# Patient Record
Sex: FEMALE | Race: WHITE | URBAN - METROPOLITAN AREA
[De-identification: names, ages, dates, MRNs, and addresses within clinical notes are randomized per-mention and may not be internally consistent; named-entity substitution may affect disease eponyms.]

---

## 2017-01-11 ENCOUNTER — HISTORICAL (OUTPATIENT)
Dept: LAB | Facility: HOSPITAL | Age: 22
End: 2017-01-11

## 2017-01-17 ENCOUNTER — HISTORICAL (OUTPATIENT)
Dept: RADIOLOGY | Facility: HOSPITAL | Age: 22
End: 2017-01-17

## 2017-06-01 ENCOUNTER — HISTORICAL (OUTPATIENT)
Dept: LAB | Facility: HOSPITAL | Age: 22
End: 2017-06-01

## 2019-04-15 ENCOUNTER — HISTORICAL (OUTPATIENT)
Dept: ADMINISTRATIVE | Facility: HOSPITAL | Age: 24
End: 2019-04-15

## 2019-04-16 ENCOUNTER — HISTORICAL (OUTPATIENT)
Dept: ADMINISTRATIVE | Facility: HOSPITAL | Age: 24
End: 2019-04-16

## 2019-04-18 ENCOUNTER — HISTORICAL (OUTPATIENT)
Dept: SURGERY | Facility: HOSPITAL | Age: 24
End: 2019-04-18

## 2019-04-18 LAB — B-HCG SERPL QL: NEGATIVE

## 2019-05-29 ENCOUNTER — HISTORICAL (OUTPATIENT)
Dept: ADMINISTRATIVE | Facility: HOSPITAL | Age: 24
End: 2019-05-29

## 2022-04-30 NOTE — OP NOTE
Patient:   Jaylin Olivia            MRN: [id number]            FIN: [id number]               Age:   23 years     Sex:  Female     :  1995   Associated Diagnoses:   None   Author:   Ritchie Elias MD      Date of surgery: 2019    Surgeon: Ritchie Elias MD    Attending: Dr. Cuello    Preoperative diagnosis: Right ankle lateral malleolus fracture    Postoperative diagnosis: Same    Procedure performed: Open reduction internal fixation of right lateral malleolus fracture    Implants: Arthrex distal fibular locking plate, 2.7 mm cortical screw x1, shaft cortical screws x3, 2.7 mm locking screws x4    Estimated blood loss: Minimal    Complications: None    Operative technique    The patient is a 23-year-old female who sustained a right lateral malleolus fracture of the ankle after twisting injury.  There is significant fracture displacement she has significant pain and swelling as well.  We offered her open reduction internal fixation as is appropriate for her injury.  We discussed risks and benefits of surgery as well as nonoperative treatment.  She elected to proceed with surgery.  The most pertinent risks we discussed were infection, bleeding, nerve damage, malunion need for hardware removal, hardware failure, arthritis.  She signed informed consent.  She was seen in the preoperative area where she was verified to be n.p.o., the operative site was identified, and she was taken to the operating room.  She received perioperative antibiotics as appropriate.  She was administered general endotracheal anesthesia and placed in the supine position with all bony prominences well-padded.  An x-ray of the contralateral ankle mortise was taken to assist with reduction of the operative limb.  A tourniquet was placed to the right lower extremity.  Right lower extremity was prepped and draped in the usual sterile fashion and a timeout was performed.    Limb was exsanguinated and the tourniquet was  inflated to 300 mmHg.  A 10 blade was used to perform a lateral incision over the fibula through skin and subcutaneous tissue.  Dissection was taken to bone and fracture site with care to not damage the superficial peroneal nerve which was visualized in the proximal external wound.  The nerve was protected.  Fracture was fully identified and all hematoma, soft callus, and developed periosteum was removed from the fracture.  A serrated reduction clamp was used to reduce the fracture.  Up with the clamp in place fluoroscopy shot was taken to confirm that appropriate length and reduction had been obtained.  Reduction was excellent.  A anterior to posterior 2.7 mm cortical screw was placed between the fracture fragments in the AO lag by technique fashion.  Excellent purchase and compression was obtained to the fracture.  Next an Arthrex distal fibular locking plate was positioned.  The plate was fixed to the shaft with a cortical screw.  The distal screw holes were drilled through the appropriate guides.  4 locking screws were placed distal to the fracture.  A fluoroscopy shot confirmed the appropriate length and that they were extra-articular.  The remaining 2 shaft cortical holes were filled with excellent bite with cortical screws.  A syndesmosis radiographic stress test was performed with a serrated reduction clamp and no syndesmosis instability was identified.  Final fluoroscopic images were obtained.  The wound was thoroughly irrigated with normal saline and closed with Vicryl and nylon.  A soft dressing with a well-padded posterior slab splint with stirrups was placed.  Tourniquet was let down.  The patient was awakened from general endotracheal anesthesia having suffered no untoward events from the procedure.    Postoperative plan:  Patient will be nonweightbearing for approximately 6 weeks  Sutures out of 2 weeks  X-rays in 6 weeks.

## 2022-05-02 NOTE — HISTORICAL [facility] CERNER
This is a historical note converted from [employer]. Formatting and pictures may have been removed.  Please reference Roxane for original formatting and attached multimedia. Chief Complaint  Here with Rt. Distal Fib Fx. DOI ?04/11/19  History of Present Illness  23-year-old female who slipped?on Thursday.? She felt a crunch and had immediate pain in her right ankle. ?She has had significant pain and swelling?since this point. ?She has had difficulty walking on her crutches as well as she is a history of a multi-ligamentous knee reconstruction.? She denies any baseline issues with her foot or ankle. ?She denies any numbness tingling. ?She said there is never any bleeding or bone that popped out of the skin.? She has been in her moms old cam boot since.  Review of Systems  As per HPI  Physical Exam  Vitals & Measurements  HT:?167.74?cm? WT:?92.5?kg? BMI:?32.88?  General alert and oriented in no acute distress  Chest normal work of breathing  Abdomen soft, nondistended  Right lower extremity  Significant plantar ecchymosis and lateral ecchymosis.? Patient has significant swelling over the lateral ankle however she has?an intact wrinkle sign.? She is fully neurovascular intact. ?EHL, FHL, tibialis anterior, gastrocsoleus complex are intact. ?Dorsalis pedis pulses +2.? Patient has no wounds.  Assessment/Plan  Fractured lateral malleolus  ?I reviewed the patients films and she has a displaced lateral malleolus fracture with medial clear space widening on the gravity stress view. ?I discussed the natural history nature of this injury as well as the risks and benefits of various treatment options including no intervention, immobilization, and surgical solutions including open reduction internal fixation. ?Believe the open reduction internal fixation would be the best course of action for this patient given the displacement and medial clear space widening on the gravity stress film as well as her age.? I discussed the risks and  benefits of this treatment with the most pertinent risks being?a nonunion, malunion, hardware failure, need for future hardware removal, numbness,?pain, bleeding,?and infection.? She elected to proceed with surgery and signed informed consent. ?We will plan on her?receiving open reduction internal fixation of the right lateral malleolus?on Thursday the 18th?with possible syndesmosis fixation. ?Will use Arthrex for this.? Placed in?into a?posterior slab splint with stirrups in the meantime to help control any motion and further reduce swelling.  Ordered:  XR Ankle Right Minimum 3 Views, Routine, 04/15/19 10:53:00 CDT, Follow Up Trauma, None, Stretcher, Rad Type, Fractured lateral malleolus, Not Scheduled, 04/15/19 10:53:00 CDT  ?   Problem List/Past Medical History  Ongoing  Obesity  Tobacco user  Historical  None  Procedure/Surgical History  Arthroscopically aided anterior cruciate ligament repair/augmentation or reconstruction. (07/14/2014)  Arthroscopically aided posterior cruciate ligament repair/augmentation or reconstruction. (07/14/2014)  Arthroscopy ACL Reconstruction (Right) (07/14/2014)  Arthroscopy, knee, surgical; with meniscus repair (medial OR lateral). (07/14/2014)  HOSPITAL OBSERVATION SERVICE, PER HOUR (07/14/2014)  HOSPITAL OBSERVATION SERVICE, PER HOUR (07/14/2014)  HOSPITAL OBSERVATION SERVICE, PER HOUR (07/14/2014)  HOSPITAL OBSERVATION SERVICE, PER HOUR (07/14/2014)  HOSPITAL OBSERVATION SERVICE, PER HOUR (07/14/2014)  HOSPITAL OBSERVATION SERVICE, PER HOUR (07/14/2014)  HOSPITAL OBSERVATION SERVICE, PER HOUR (07/14/2014)  HOSPITAL OBSERVATION SERVICE, PER HOUR (07/14/2014)  HOSPITAL OBSERVATION SERVICE, PER HOUR (07/14/2014)  HOSPITAL OBSERVATION SERVICE, PER HOUR (07/14/2014)  HOSPITAL OBSERVATION SERVICE, PER HOUR (07/14/2014)  HOSPITAL OBSERVATION SERVICE, PER HOUR (07/14/2014)  HOSPITAL OBSERVATION SERVICE, PER HOUR (07/14/2014)  HOSPITAL OBSERVATION SERVICE, PER HOUR (07/14/2014)  HOSPITAL  OBSERVATION SERVICE, PER HOUR (07/14/2014)  HOSPITAL OBSERVATION SERVICE, PER HOUR (07/14/2014)  HOSPITAL OBSERVATION SERVICE, PER HOUR (07/14/2014)  HOSPITAL OBSERVATION SERVICE, PER HOUR (07/14/2014)  HOSPITAL OBSERVATION SERVICE, PER HOUR (07/14/2014)  HOSPITAL OBSERVATION SERVICE, PER HOUR (07/14/2014)  HOSPITAL OBSERVATION SERVICE, PER HOUR (07/14/2014)  HOSPITAL OBSERVATION SERVICE, PER HOUR (07/14/2014)  HOSPITAL OBSERVATION SERVICE, PER HOUR (07/14/2014)  HOSPITAL OBSERVATION SERVICE, PER HOUR (07/14/2014)  HOSPITAL OBSERVATION SERVICE, PER HOUR (07/14/2014)  HOSPITAL OBSERVATION SERVICE, PER HOUR (07/14/2014)  HOSPITAL OBSERVATION SERVICE, PER HOUR (07/14/2014)  HOSPITAL OBSERVATION SERVICE, PER HOUR (07/14/2014)  HOSPITAL OBSERVATION SERVICE, PER HOUR (07/14/2014)  HOSPITAL OBSERVATION SERVICE, PER HOUR (07/14/2014)  HOSPITAL OBSERVATION SERVICE, PER HOUR (07/14/2014)  HOSPITAL OBSERVATION SERVICE, PER HOUR (07/14/2014)  HOSPITAL OBSERVATION SERVICE, PER HOUR (07/14/2014)  HOSPITAL OBSERVATION SERVICE, PER HOUR (07/14/2014)  HOSPITAL OBSERVATION SERVICE, PER HOUR (07/14/2014)  HOSPITAL OBSERVATION SERVICE, PER HOUR (07/14/2014)  HOSPITAL OBSERVATION SERVICE, PER HOUR (07/14/2014)  HOSPITAL OBSERVATION SERVICE, PER HOUR (07/14/2014)  HOSPITAL OBSERVATION SERVICE, PER HOUR (07/14/2014)  HOSPITAL OBSERVATION SERVICE, PER HOUR (07/14/2014)  HOSPITAL OBSERVATION SERVICE, PER HOUR (07/14/2014)  HOSPITAL OBSERVATION SERVICE, PER HOUR (07/14/2014)  HOSPITAL OBSERVATION SERVICE, PER HOUR (07/14/2014)  HOSPITAL OBSERVATION SERVICE, PER HOUR (07/14/2014)  HOSPITAL OBSERVATION SERVICE, PER HOUR (07/14/2014)  Other repair of knee (07/14/2014)  Other repair of the collateral ligaments (07/14/2014)  Other repair of the cruciate ligaments (07/14/2014)  Posterior Cruciate Ligament Reconstruct (Right) (07/14/2014)  Unlisted procedure, arthroscopy. (07/14/2014)  Closed reduction of dislocation of knee  (07/02/2014)  Continuous invasive mechanical ventilation for less than 96 consecutive hours (07/02/2014)  Insertion of endotracheal tube (07/02/2014)  Internal fixation of femur without fracture reduction (07/02/2014)  Intramedullary Dusty Insert Retro Femur (Left) (07/02/2014)  Other incision of pleura (07/02/2014)  Transfusion of packed cells (07/02/2014)  Cholecystectomy;  pe tubes  tonselectomy   Medications  busPIRone 5 mg oral tablet, 5 mg= 1 tab(s), Oral, Daily,? ?Not Taking, Completed Rx  citalopram 20 mg oral tablet, 20 mg= 1 tab(s), Oral, qPM,? ?Not Taking, Completed Rx  Imitrex 25 mg oral tablet, 25 mg= 1 tab(s), Oral, Daily, PRN, 1 refills,? ?Not taking  Norco 10 mg-325 mg oral tablet, 1 tab(s), Oral, q6hr, PRN  Allergies  No Known Allergies  Social History  Alcohol  Tobacco  10 or more cigarettes (1/2 pack or more)/day in last 30 days, Cigarettes, No, Ready to change: No., 04/15/2019  Never (less than 100 in lifetime), No, 04/11/2019  10 or more cigarettes (1/2 pack or more)/day in last 30 days, Cigarettes, No, Ready to change: No., 04/05/2019  Current some day smoker, 09/23/2017  Health Maintenance  Health Maintenance  ???Pending?(in the next year)  ??? ??Due?  ??? ? ? ?ADL Screening due??04/15/19??and every 1??year(s)  ??? ? ? ?Alcohol Misuse Screening due??04/15/19??and every 1??year(s)  ??? ? ? ?Smoking Cessation due??04/15/19??Variable frequency  ??? ? ? ?Tetanus Vaccine due??04/15/19??and every 10??year(s)  ???Satisfied?(in the past 1 year)  ??? ??Satisfied?  ??? ? ? ?Blood Pressure Screening on??04/12/19.??Satisfied by Angela PHAM, Darlene WALTERS  ??? ? ? ?Body Mass Index Check on??04/15/19.??Satisfied by Cecily Dowell LPN  ??? ? ? ?Obesity Screening on??04/15/19.??Satisfied by Cecily Dowell LPN  ?  ?      I was present with the resident during the history and exam.  ???  [x ] I discussed the case with the resident and agree with the findings and plan as documented in the residents note.  [ ]  I discussed the case with the resident and agree with the findings and plan as documented in the residents note except:x

## 2022-05-02 NOTE — HISTORICAL [facility] CERNER
This is a historical note converted from [employer]. Formatting and pictures may have been removed.  Please reference [employer] for original formatting and attached multimedia. Patient was referred to Dr. Conway in [place] for evaluation for TKA.? I was informed by Dr. Hand office that patient has been scheduled for evaluation on 1/22/2020 at 10:30 AM. ?Patient is aware.

## 2022-05-02 NOTE — HISTORICAL [facility] CERNER
This is a historical note converted from [employer]ane. Formatting and pictures may have been removed.  Please reference Roxane for original formatting and attached multimedia. Chief Complaint  F/U Rt ankle Fx  History of Present Illness  23-year-old female?6 weeks status post open reduction internal fixation right lateral malleolus fracture.??She did say that she has been walking?on the right ankle the past couple weeks as it is felt better. ?No additional falls.? 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 ?She says when she gets back to work she is can be in a supervisory role initially not walking around too much she feels that she can meet these requirements. ?She will gradually increase her activity level as?her stiffness in her ankle resolves and she is walking around more normally.? No issues with the incision site or signs of infection.  Review of Systems  As per [facility]  Physical Exam  Vitals & Measurements  HT:?167?cm? WT:?89.1?kg? BMI:?31.95?  Gen:? NAD, A+Ox3  Cardio:? RRR  Pulm:? No increased WOB  ?   RLE:  Incision well-healed, no signs of infection  No swelling  No tenderness to palpation about fracture site  Motor: intact ehl, fhl, ta, gs  SILT: t/s/s/sp/dp  2+ DP, bcr, wwp  ?   X-ray right ankle healed lateral malleolus fracture no signs of hardware failure.  Assessment/Plan  Closed right ankle fracture?S82.891A  23-year-old female?6 weeks status post open reduction internal fixation right lateral malleolus fracture without syndesmotic fixation.  -Weight-bear as tolerated?right lower extremity,?discontinue cam boot.? Resume normal shoewear. ?Work note provided.? Frequent breaks may return to work next week if she is feeling she is walking around better which she assures me she?will be.? Return to clinic as needed.   Problem List/Past Medical History  Ongoing  Obesity  Tobacco user  Historical  None  Procedure/Surgical History  Open treatment of distal fibular fracture (lateral malleolus), includes  internal fixation, when performed (04/18/2019)  ORIF Ankle (Right) (04/18/2019)  Reposition Right Fibula with Internal Fixation Device, Open Approach (04/18/2019)  Application of short leg splint (calf to foot) (04/11/2019)  Immobilization of Right Lower Leg using Splint (04/11/2019)  Arthroscopically aided anterior cruciate ligament repair/augmentation or reconstruction. (07/14/2014)  Arthroscopically aided posterior cruciate ligament repair/augmentation or reconstruction. (07/14/2014)  Arthroscopy ACL Reconstruction (Right) (07/14/2014)  Arthroscopy, knee, surgical; with meniscus repair (medial OR lateral). (07/14/2014)  HOSPITAL OBSERVATION SERVICE, PER HOUR (07/14/2014)  HOSPITAL OBSERVATION SERVICE, PER HOUR (07/14/2014)  HOSPITAL OBSERVATION SERVICE, PER HOUR (07/14/2014)  HOSPITAL OBSERVATION SERVICE, PER HOUR (07/14/2014)  HOSPITAL OBSERVATION SERVICE, PER HOUR (07/14/2014)  HOSPITAL OBSERVATION SERVICE, PER HOUR (07/14/2014)  HOSPITAL OBSERVATION SERVICE, PER HOUR (07/14/2014)  HOSPITAL OBSERVATION SERVICE, PER HOUR (07/14/2014)  HOSPITAL OBSERVATION SERVICE, PER HOUR (07/14/2014)  HOSPITAL OBSERVATION SERVICE, PER HOUR (07/14/2014)  HOSPITAL OBSERVATION SERVICE, PER HOUR (07/14/2014)  HOSPITAL OBSERVATION SERVICE, PER HOUR (07/14/2014)  HOSPITAL OBSERVATION SERVICE, PER HOUR (07/14/2014)  HOSPITAL OBSERVATION SERVICE, PER HOUR (07/14/2014)  HOSPITAL OBSERVATION SERVICE, PER HOUR (07/14/2014)  HOSPITAL OBSERVATION SERVICE, PER HOUR (07/14/2014)  HOSPITAL OBSERVATION SERVICE, PER HOUR (07/14/2014)  HOSPITAL OBSERVATION SERVICE, PER HOUR (07/14/2014)  HOSPITAL OBSERVATION SERVICE, PER HOUR (07/14/2014)  HOSPITAL OBSERVATION SERVICE, PER HOUR (07/14/2014)  HOSPITAL OBSERVATION SERVICE, PER HOUR (07/14/2014)  HOSPITAL OBSERVATION SERVICE, PER HOUR (07/14/2014)  HOSPITAL OBSERVATION SERVICE, PER HOUR (07/14/2014)  HOSPITAL OBSERVATION SERVICE, PER HOUR (07/14/2014)  HOSPITAL OBSERVATION SERVICE, PER HOUR  (07/14/2014)  HOSPITAL OBSERVATION SERVICE, PER HOUR (07/14/2014)  HOSPITAL OBSERVATION SERVICE, PER HOUR (07/14/2014)  HOSPITAL OBSERVATION SERVICE, PER HOUR (07/14/2014)  HOSPITAL OBSERVATION SERVICE, PER HOUR (07/14/2014)  HOSPITAL OBSERVATION SERVICE, PER HOUR (07/14/2014)  HOSPITAL OBSERVATION SERVICE, PER HOUR (07/14/2014)  HOSPITAL OBSERVATION SERVICE, PER HOUR (07/14/2014)  HOSPITAL OBSERVATION SERVICE, PER HOUR (07/14/2014)  HOSPITAL OBSERVATION SERVICE, PER HOUR (07/14/2014)  HOSPITAL OBSERVATION SERVICE, PER HOUR (07/14/2014)  HOSPITAL OBSERVATION SERVICE, PER HOUR (07/14/2014)  HOSPITAL OBSERVATION SERVICE, PER HOUR (07/14/2014)  HOSPITAL OBSERVATION SERVICE, PER HOUR (07/14/2014)  HOSPITAL OBSERVATION SERVICE, PER HOUR (07/14/2014)  HOSPITAL OBSERVATION SERVICE, PER HOUR (07/14/2014)  HOSPITAL OBSERVATION SERVICE, PER HOUR (07/14/2014)  HOSPITAL OBSERVATION SERVICE, PER HOUR (07/14/2014)  HOSPITAL OBSERVATION SERVICE, PER HOUR (07/14/2014)  HOSPITAL OBSERVATION SERVICE, PER HOUR (07/14/2014)  HOSPITAL OBSERVATION SERVICE, PER HOUR (07/14/2014)  Other repair of knee (07/14/2014)  Other repair of the collateral ligaments (07/14/2014)  Other repair of the cruciate ligaments (07/14/2014)  Posterior Cruciate Ligament Reconstruct (Right) (07/14/2014)  Unlisted procedure, arthroscopy. (07/14/2014)  Closed reduction of dislocation of knee (07/02/2014)  Continuous invasive mechanical ventilation for less than 96 consecutive hours (07/02/2014)  Insertion of endotracheal tube (07/02/2014)  Internal fixation of femur without fracture reduction (07/02/2014)  Intramedullary Dusty Insert Retro Femur (Left) (07/02/2014)  Other incision of pleura (07/02/2014)  Transfusion of packed cells (07/02/2014)  Cholecystectomy;  pe tubes  tonselectomy   Medications  acetaminophen-hydrocodone 325 mg-10 mg oral tablet, 1 tab(s), Oral, QID,? ?Not Taking, Completed Rx  acetaminophen-oxycodone 325 mg-5 mg oral tablet, Oral,? ?Not  taking  busPIRone 5 mg oral tablet, 5 mg= 1 tab(s), Oral, Daily  SUMAtriptan 25 mg oral tablet,? ?Not Taking, Completed Rx  Allergies  Imitrex?(Anxiety)  Social History  Alcohol  Current, 1-2 times per month, 04/16/2019  Employment/School  Employed, 05/01/2019  Home/Environment  Lives with Children, Father, Mother. Living situation: Home/Independent. Smoker in household: Yes., 05/01/2019  Nutrition/Health  Regular, 05/01/2019  Sexual  Sexually active: Yes. Sexual orientation: Lesbian, crawford or homosexual. Gender Identity Identifies as female., 05/01/2019  Substance Abuse  Past, Marijuana, 05/01/2019  Tobacco  4 or less cigarettes(less than 1/4 pack)/day in last 30 days, Cigarettes, No, Ready to change: Yes. 1 per day., 05/29/2019  Family History  Primary malignant neoplasm of brain: Mother.  Health Maintenance  Health Maintenance  ???Pending?(in the next year)  ??? ??OverDue  ??? ? ? ?Blood Pressure Screening due??06/17/17??and every 1??year(s)  ??? ? ? ?Smoking Cessation due??01/01/19??Variable frequency  ??? ??Due?  ??? ? ? ?Cervical Cancer Screening due??05/29/19??and every?  ??? ? ? ?Tetanus Vaccine due??05/29/19??and every 10??year(s)  ??? ??Due In Future?  ??? ? ? ?Alcohol Misuse Screening not due until??01/01/20??and every 1??year(s)  ??? ? ? ?Obesity Screening not due until??01/01/20??and every 1??year(s)  ??? ? ? ?Depression Screening not due until??04/30/20??and every 1??year(s)  ??? ? ? ?ADL Screening not due until??05/01/20??and every 1??year(s)  ??? ? ? ?Body Mass Index Check not due until??05/28/20??and every 1??year(s)  ???Satisfied?(in the past 1 year)  ??? ??Satisfied?  ??? ? ? ?ADL Screening on??05/01/19.??Satisfied by Marta Burns  ??? ? ? ?Alcohol Misuse Screening on??05/01/19.??Satisfied by Marta uBrns  ??? ? ? ?Blood Pressure Screening on??04/18/19.??Satisfied by Helene Clark RN  ??? ? ? ?Body Mass Index Check on??05/29/19.??Satisfied by Walker Wheeler  ??? ? ?  ?Depression Screening on??05/01/19.??Satisfied by Marta Burns  ??? ? ? ?Obesity Screening on??05/29/19.??Satisfied by Walker Wheeler  ?  ?      Jaylin Olivia?s?medical history, post-op?exam findings right ankle, diagnosis, and treatment outlined by?DrMateo?Carline has been reviewed.??Treatment plan is determined to be reasonable and appropriate.?I was present during the evaluation. X-rays right ankle reviewed.  ?

## 2025-04-06 ENCOUNTER — HOSPITAL ENCOUNTER (EMERGENCY)
Facility: HOSPITAL | Age: 30
Discharge: PSYCHIATRIC OR SUBSTANCE USE FACILITY | End: 2025-04-07
Payer: COMMERCIAL

## 2025-04-06 VITALS
SYSTOLIC BLOOD PRESSURE: 126 MMHG | OXYGEN SATURATION: 100 % | HEART RATE: 99 BPM | BODY MASS INDEX: 33.75 KG/M2 | RESPIRATION RATE: 20 BRPM | DIASTOLIC BLOOD PRESSURE: 83 MMHG | HEIGHT: 66 IN | WEIGHT: 210 LBS | TEMPERATURE: 98 F

## 2025-04-06 DIAGNOSIS — S70.10XA CONTUSION OF THIGH, UNSPECIFIED LATERALITY, INITIAL ENCOUNTER: ICD-10-CM

## 2025-04-06 DIAGNOSIS — F10.10 ALCOHOL ABUSE: ICD-10-CM

## 2025-04-06 DIAGNOSIS — R45.851 SUICIDAL IDEATION: Primary | ICD-10-CM
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
## 2025-04-06 LAB
ACCEPTIBLE SP GR UR QL: 1.01 (ref 1–1.03)
ALBUMIN SERPL-MCNC: 4.2 G/DL (ref 3.5–5)
ALBUMIN/GLOB SERPL: 1.1 RATIO (ref 1.1–2)
ALP SERPL-CCNC: 67 UNIT/L (ref 40–150)
ALT SERPL-CCNC: 41 UNIT/L (ref 0–55)
AMPHET UR QL SCN: NEGATIVE
ANION GAP SERPL CALC-SCNC: 15 MEQ/L
APAP SERPL-MCNC: <3 UG/ML (ref 10–30)
AST SERPL-CCNC: 60 UNIT/L (ref 11–45)
B-HCG UR QL: NEGATIVE
BARBITURATE SCN PRESENT UR: NEGATIVE
BASOPHILS # BLD AUTO: 0.03 X10(3)/MCL
BASOPHILS NFR BLD AUTO: 0.2 %
BENZODIAZ UR QL SCN: NEGATIVE
BILIRUB SERPL-MCNC: 0.7 MG/DL
BILIRUB UR QL STRIP.AUTO: NEGATIVE
BUN SERPL-MCNC: 13 MG/DL (ref 7–18.7)
CALCIUM SERPL-MCNC: 8.6 MG/DL (ref 8.4–10.2)
CANNABINOIDS UR QL SCN: POSITIVE
CHLORIDE SERPL-SCNC: 106 MMOL/L (ref 98–107)
CLARITY UR: CLEAR
CO2 SERPL-SCNC: 20 MMOL/L (ref 22–29)
COCAINE UR QL SCN: NEGATIVE
COLOR UR AUTO: NORMAL
CREAT SERPL-MCNC: 0.88 MG/DL (ref 0.55–1.02)
CREAT/UREA NIT SERPL: 15
EOSINOPHIL # BLD AUTO: 0.03 X10(3)/MCL (ref 0–0.9)
EOSINOPHIL NFR BLD AUTO: 0.2 %
ERYTHROCYTE [DISTWIDTH] IN BLOOD BY AUTOMATED COUNT: 12.1 % (ref 11.5–17)
ETHANOL SERPL-MCNC: 225 MG/DL
ETHANOL SERPL-MCNC: 300 MG/DL
FENTANYL UR QL SCN: NEGATIVE
GFR SERPLBLD CREATININE-BSD FMLA CKD-EPI: >60 ML/MIN/1.73/M2
GLOBULIN SER-MCNC: 3.7 GM/DL (ref 2.4–3.5)
GLUCOSE SERPL-MCNC: 115 MG/DL (ref 74–100)
GLUCOSE UR QL STRIP: NEGATIVE
HCT VFR BLD AUTO: 45.2 % (ref 37–47)
HGB BLD-MCNC: 15.8 G/DL (ref 12–16)
HGB UR QL STRIP: NEGATIVE
IMM GRANULOCYTES # BLD AUTO: 0.02 X10(3)/MCL (ref 0–0.04)
IMM GRANULOCYTES NFR BLD AUTO: 0.2 %
KETONES UR QL STRIP: NEGATIVE
LEUKOCYTE ESTERASE UR QL STRIP: NEGATIVE
LIPASE SERPL-CCNC: 27 U/L
LYMPHOCYTES # BLD AUTO: 1.93 X10(3)/MCL (ref 0.6–4.6)
LYMPHOCYTES NFR BLD AUTO: 14.7 %
MCH RBC QN AUTO: 33.9 PG (ref 27–31)
MCHC RBC AUTO-ENTMCNC: 35 G/DL (ref 33–36)
MCV RBC AUTO: 97 FL (ref 80–94)
MONOCYTES # BLD AUTO: 0.94 X10(3)/MCL (ref 0.1–1.3)
MONOCYTES NFR BLD AUTO: 7.2 %
NEUTROPHILS # BLD AUTO: 10.19 X10(3)/MCL (ref 2.1–9.2)
NEUTROPHILS NFR BLD AUTO: 77.5 %
NITRITE UR QL STRIP: NEGATIVE
OPIATES UR QL SCN: NEGATIVE
PCP UR QL: NEGATIVE
PH UR STRIP: 6 [PH]
PH UR: 6 [PH] (ref 3–11)
PLATELET # BLD AUTO: 253 X10(3)/MCL (ref 130–400)
PMV BLD AUTO: 8.2 FL (ref 7.4–10.4)
POTASSIUM SERPL-SCNC: 3.8 MMOL/L (ref 3.5–5.1)
PROT SERPL-MCNC: 7.9 GM/DL (ref 6.4–8.3)
PROT UR QL STRIP: NEGATIVE
RBC # BLD AUTO: 4.66 X10(6)/MCL (ref 4.2–5.4)
SODIUM SERPL-SCNC: 141 MMOL/L (ref 136–145)
SP GR UR STRIP.AUTO: 1.01 (ref 1–1.03)
UROBILINOGEN UR STRIP-ACNC: 0.2
WBC # BLD AUTO: 13.14 X10(3)/MCL (ref 4.5–11.5)

## 2025-04-06 PROCEDURE — 80143 DRUG ASSAY ACETAMINOPHEN: CPT

## 2025-04-06 PROCEDURE — 81025 URINE PREGNANCY TEST: CPT

## 2025-04-06 PROCEDURE — 81003 URINALYSIS AUTO W/O SCOPE: CPT

## 2025-04-06 PROCEDURE — 80053 COMPREHEN METABOLIC PANEL: CPT

## 2025-04-06 PROCEDURE — 80307 DRUG TEST PRSMV CHEM ANLYZR: CPT

## 2025-04-06 PROCEDURE — 63600175 PHARM REV CODE 636 W HCPCS: Mod: JZ,TB

## 2025-04-06 PROCEDURE — 85025 COMPLETE CBC W/AUTO DIFF WBC: CPT

## 2025-04-06 PROCEDURE — 99285 EMERGENCY DEPT VISIT HI MDM: CPT | Mod: 25

## 2025-04-06 PROCEDURE — 82077 ASSAY SPEC XCP UR&BREATH IA: CPT

## 2025-04-06 PROCEDURE — 83690 ASSAY OF LIPASE: CPT

## 2025-04-06 PROCEDURE — 96372 THER/PROPH/DIAG INJ SC/IM: CPT

## 2025-04-06 RX ORDER — KETOROLAC TROMETHAMINE 30 MG/ML
15 INJECTION, SOLUTION INTRAMUSCULAR; INTRAVENOUS
Status: COMPLETED | OUTPATIENT
Start: 2025-04-06 | End: 2025-04-06

## 2025-04-06 RX ADMIN — KETOROLAC TROMETHAMINE 15 MG: 30 INJECTION, SOLUTION INTRAMUSCULAR at 09:04

## 2025-04-07 ENCOUNTER — HOSPITAL ENCOUNTER (INPATIENT)
Facility: HOSPITAL | Age: 30
LOS: 3 days | Discharge: HOME | DRG: 885 | End: 2025-04-10
Attending: PSYCHIATRY & NEUROLOGY | Admitting: PSYCHIATRY & NEUROLOGY
Payer: COMMERCIAL

## 2025-04-07 DIAGNOSIS — F32.A DEPRESSION: ICD-10-CM

## 2025-04-07 PROBLEM — F33.1 MAJOR DEPRESSIVE DISORDER, RECURRENT EPISODE, MODERATE: Status: ACTIVE | Noted: 2025-04-07

## 2025-04-07 PROBLEM — F10.20 ALCOHOLISM: Status: ACTIVE | Noted: 2025-04-07

## 2025-04-07 PROBLEM — F41.1 GENERALIZED ANXIETY DISORDER: Status: ACTIVE | Noted: 2025-04-07

## 2025-04-07 PROBLEM — F12.20 CANNABIS DEPENDENCE, CONTINUOUS: Status: ACTIVE | Noted: 2025-04-07

## 2025-04-07 PROCEDURE — 25000003 PHARM REV CODE 250: Performed by: PSYCHIATRY & NEUROLOGY

## 2025-04-07 PROCEDURE — 11400000 HC PSYCH PRIVATE ROOM

## 2025-04-07 RX ORDER — HYDROXYZINE HYDROCHLORIDE 50 MG/1
50 TABLET, FILM COATED ORAL EVERY 4 HOURS PRN
Status: DISCONTINUED | OUTPATIENT
Start: 2025-04-07 | End: 2025-04-10 | Stop reason: HOSPADM

## 2025-04-07 RX ORDER — HALOPERIDOL LACTATE 5 MG/ML
5 INJECTION, SOLUTION INTRAMUSCULAR EVERY 6 HOURS PRN
Status: DISCONTINUED | OUTPATIENT
Start: 2025-04-08 | End: 2025-04-07

## 2025-04-07 RX ORDER — HALOPERIDOL 5 MG/1
5 TABLET ORAL EVERY 6 HOURS PRN
Status: DISCONTINUED | OUTPATIENT
Start: 2025-04-07 | End: 2025-04-10 | Stop reason: HOSPADM

## 2025-04-07 RX ORDER — DESVENLAFAXINE 50 MG/1
50 TABLET, FILM COATED, EXTENDED RELEASE ORAL DAILY
Status: DISCONTINUED | OUTPATIENT
Start: 2025-04-07 | End: 2025-04-10 | Stop reason: HOSPADM

## 2025-04-07 RX ORDER — DESVENLAFAXINE 50 MG/1
50 TABLET, FILM COATED, EXTENDED RELEASE ORAL DAILY
Status: ON HOLD | COMMUNITY
Start: 2025-02-24 | End: 2025-04-10

## 2025-04-07 RX ORDER — HALOPERIDOL 5 MG/1
5 TABLET ORAL EVERY 6 HOURS PRN
Status: DISCONTINUED | OUTPATIENT
Start: 2025-04-08 | End: 2025-04-07

## 2025-04-07 RX ORDER — ACETAMINOPHEN 325 MG/1
650 TABLET ORAL EVERY 6 HOURS PRN
Status: DISCONTINUED | OUTPATIENT
Start: 2025-04-07 | End: 2025-04-10 | Stop reason: HOSPADM

## 2025-04-07 RX ORDER — ALUMINUM HYDROXIDE, MAGNESIUM HYDROXIDE, AND SIMETHICONE 1200; 120; 1200 MG/30ML; MG/30ML; MG/30ML
30 SUSPENSION ORAL EVERY 6 HOURS PRN
Status: DISCONTINUED | OUTPATIENT
Start: 2025-04-07 | End: 2025-04-10 | Stop reason: HOSPADM

## 2025-04-07 RX ORDER — LORAZEPAM 1 MG/1
2 TABLET ORAL EVERY 6 HOURS PRN
Status: DISCONTINUED | OUTPATIENT
Start: 2025-04-08 | End: 2025-04-07

## 2025-04-07 RX ORDER — TRAZODONE HYDROCHLORIDE 100 MG/1
100 TABLET ORAL NIGHTLY PRN
Status: DISCONTINUED | OUTPATIENT
Start: 2025-04-07 | End: 2025-04-10 | Stop reason: HOSPADM

## 2025-04-07 RX ORDER — DIPHENHYDRAMINE HYDROCHLORIDE 50 MG/ML
50 INJECTION, SOLUTION INTRAMUSCULAR; INTRAVENOUS EVERY 6 HOURS PRN
Status: DISCONTINUED | OUTPATIENT
Start: 2025-04-07 | End: 2025-04-10 | Stop reason: HOSPADM

## 2025-04-07 RX ORDER — LORAZEPAM 2 MG/ML
2 INJECTION INTRAMUSCULAR EVERY 6 HOURS PRN
Status: DISCONTINUED | OUTPATIENT
Start: 2025-04-07 | End: 2025-04-10 | Stop reason: HOSPADM

## 2025-04-07 RX ORDER — DIPHENHYDRAMINE HCL 25 MG
50 CAPSULE ORAL EVERY 6 HOURS PRN
Status: DISCONTINUED | OUTPATIENT
Start: 2025-04-07 | End: 2025-04-10 | Stop reason: HOSPADM

## 2025-04-07 RX ORDER — LORAZEPAM 1 MG/1
2 TABLET ORAL EVERY 6 HOURS PRN
Status: DISCONTINUED | OUTPATIENT
Start: 2025-04-07 | End: 2025-04-10 | Stop reason: HOSPADM

## 2025-04-07 RX ORDER — DIPHENHYDRAMINE HCL 25 MG
50 CAPSULE ORAL EVERY 6 HOURS PRN
Status: DISCONTINUED | OUTPATIENT
Start: 2025-04-08 | End: 2025-04-07

## 2025-04-07 RX ORDER — LORAZEPAM 2 MG/ML
2 INJECTION INTRAMUSCULAR EVERY 6 HOURS PRN
Status: DISCONTINUED | OUTPATIENT
Start: 2025-04-08 | End: 2025-04-07

## 2025-04-07 RX ORDER — HALOPERIDOL LACTATE 5 MG/ML
5 INJECTION, SOLUTION INTRAMUSCULAR EVERY 6 HOURS PRN
Status: DISCONTINUED | OUTPATIENT
Start: 2025-04-07 | End: 2025-04-10 | Stop reason: HOSPADM

## 2025-04-07 RX ORDER — IBUPROFEN 200 MG
1 TABLET ORAL DAILY
Status: DISCONTINUED | OUTPATIENT
Start: 2025-04-07 | End: 2025-04-10 | Stop reason: HOSPADM

## 2025-04-07 RX ORDER — ACETAMINOPHEN 325 MG/1
650 TABLET ORAL EVERY 6 HOURS PRN
Status: DISCONTINUED | OUTPATIENT
Start: 2025-04-08 | End: 2025-04-07

## 2025-04-07 RX ORDER — ALUMINUM HYDROXIDE, MAGNESIUM HYDROXIDE, AND SIMETHICONE 1200; 120; 1200 MG/30ML; MG/30ML; MG/30ML
30 SUSPENSION ORAL EVERY 6 HOURS PRN
Status: DISCONTINUED | OUTPATIENT
Start: 2025-04-08 | End: 2025-04-07

## 2025-04-07 RX ORDER — DIPHENHYDRAMINE HYDROCHLORIDE 50 MG/ML
50 INJECTION, SOLUTION INTRAMUSCULAR; INTRAVENOUS EVERY 6 HOURS PRN
Status: DISCONTINUED | OUTPATIENT
Start: 2025-04-08 | End: 2025-04-07

## 2025-04-07 RX ORDER — HYDROXYZINE HYDROCHLORIDE 50 MG/1
50 TABLET, FILM COATED ORAL EVERY 4 HOURS PRN
Status: DISCONTINUED | OUTPATIENT
Start: 2025-04-08 | End: 2025-04-07

## 2025-04-07 RX ADMIN — DESVENLAFAXINE 50 MG: 50 TABLET, FILM COATED, EXTENDED RELEASE ORAL at 09:04

## 2025-04-07 RX ADMIN — HYDROXYZINE HYDROCHLORIDE 50 MG: 50 TABLET ORAL at 04:04

## 2025-04-07 RX ADMIN — HYDROXYZINE HYDROCHLORIDE 50 MG: 50 TABLET ORAL at 08:04

## 2025-04-07 RX ADMIN — HYDROXYZINE HYDROCHLORIDE 50 MG: 50 TABLET ORAL at 09:04

## 2025-04-07 RX ADMIN — ACETAMINOPHEN 650 MG: 325 TABLET, FILM COATED ORAL at 12:04

## 2025-04-07 RX ADMIN — TRAZODONE HYDROCHLORIDE 100 MG: 100 TABLET ORAL at 01:04

## 2025-04-07 RX ADMIN — TRAZODONE HYDROCHLORIDE 100 MG: 100 TABLET ORAL at 09:04

## 2025-04-07 RX ADMIN — HYDROXYZINE HYDROCHLORIDE 50 MG: 50 TABLET ORAL at 12:04

## 2025-04-07 RX ADMIN — HYDROXYZINE HYDROCHLORIDE 50 MG: 50 TABLET ORAL at 01:04

## 2025-04-07 NOTE — NURSING
0140 pt c/o anxiety and restlessness. Administered PRN atarax 50 mg and trazodone 100 mg at this time.     0215 pt noted to be resting quietly in bed with eyes closed. Respirations even/unlabored. No signs of distress or discomfort noted at this time.
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
## 2025-04-07 NOTE — PROGRESS NOTES
Refused despite encouragement, Alternative materials were offered.   04/07/25 1000   [facility] Group Therapy   Group Name Therapeutic Recreation   Specific Interventions Skilled Activity Mild Exercises   Participation Level None   Participation Quality Refused;Lack of Interest

## 2025-04-07 NOTE — PLAN OF CARE
Problem: Adult Behavioral Health Plan of Care  Goal: Plan of Care Review  4/7/2025 0122 by Lila Gonzalez RN  Outcome: Not Progressing  4/7/2025 0120 by Lila Gonzalez RN  Outcome: Not Progressing  Flowsheets (Taken 4/7/2025 0120)  Patient Agreement with Plan of Care: agrees  Plan of Care Reviewed With: patient  Goal: Patient-Specific Goal (Individualization)  4/7/2025 0122 by Lila Gonzalez RN  Outcome: Not Progressing  4/7/2025 0120 by Lila Gonzalez RN  Outcome: Not Progressing  Flowsheets (Taken 4/7/2025 0120)  Patient Personal Strengths: independent living skills  Patient Vulnerabilities:   poor impulse control   lacks insight into illness   substance abuse/addiction  Goal: Adheres to Safety Considerations for Self and Others  4/7/2025 0122 by Lila Gonzalez RN  Outcome: Not Progressing  4/7/2025 0120 by Lila Gonzalez RN  Outcome: Not Progressing  Flowsheets (Taken 4/7/2025 0120)  Adheres to Safety Considerations for Self and Others: unable to achieve outcome  Intervention: Develop and Maintain Individualized Safety Plan  Flowsheets (Taken 4/7/2025 0120)  Safety Measures: safety rounds completed  Goal: Absence of New-Onset Illness or Injury  4/7/2025 0122 by Lila Gonzalez RN  Outcome: Not Progressing  4/7/2025 0120 by Lila Gonzalez RN  Outcome: Not Progressing  Intervention: Identify and Manage Fall Risk  Flowsheets (Taken 4/7/2025 0120)  Safety Measures: safety rounds completed  Intervention: Prevent Skin Injury  Flowsheets (Taken 4/7/2025 0120)  Device Skin Pressure Protection: adhesive use limited  Intervention: Prevent VTE (Venous Thromboembolism)  Flowsheets (Taken 4/7/2025 0120)  VTE Prevention/Management: fluids promoted  Intervention: Prevent Infection  Flowsheets (Taken 4/7/2025 0120)  Infection Prevention: hand hygiene promoted  Goal: Optimized Coping Skills in Response to Life Stressors  4/7/2025 0122 by Lila Gonzalez RN  Outcome: Not Progressing  4/7/2025 0120 by Carlos  VERO Sharp  Outcome: Not Progressing  Flowsheets (Taken 4/7/2025 0120)  Optimized Coping Skills in Response to Life Stressors: unable to achieve outcome  Intervention: Promote Effective Coping Strategies  Flowsheets (Taken 4/7/2025 0120)  Supportive Measures:   self-responsibility promoted   self-reflection promoted   self-care encouraged  Goal: Develops/Participates in Therapeutic [place]anan to Support Successful Transition  4/7/2025 0122 by Lila Gonzalez RN  Outcome: Not Progressing  4/7/2025 0120 by Lila Gonzalez RN  Outcome: Not Progressing  Flowsheets (Taken 4/7/2025 0120)  Develops/Participates in Therapeutic [place] to Support Successful Transition: unable to achieve outcome  Intervention: Foster Therapeutic [place]anan  Flowsheets (Taken 4/7/2025 0120)  Trust Relationship/Rapport: care explained  Goal: Rounds/Family Conference  4/7/2025 0122 by Lila Gonzalez RN  Outcome: Not Progressing  4/7/2025 0120 by Llia Gonzalez RN  Outcome: Not Progressing     Problem: Depressive Signs/Symptoms  Goal: Optimized Energy Level (Depressive Signs/Symptoms)  Outcome: Not Progressing  Intervention: Optimize Energy Level  Flowsheets (Taken 4/7/2025 0122)  Activity (Behavioral Health): up ad harry  Diversional Activity: television  Goal: Optimized Cognitive Function (Depressive Signs/Symptoms)  Outcome: Not Progressing  Flowsheets (Taken 4/7/2025 0122)  Mutually Determined Action Steps (Optimized Cognitive Function): participates in attention training  Goal: Increased Participation and Engagement (Depressive Signs/Symptoms)  Outcome: Not Progressing  Flowsheets (Taken 4/7/2025 0122)  Mutually Determined Action Steps (Increased Participation and Engagement): participates in one or more activity  Intervention: Facilitate Participation and Engagement  Flowsheets (Taken 4/7/2025 0122)  Supportive Measures:   self-care encouraged   self-reflection promoted   active listening utilized  Diversional Activity: television  Goal:  Enhanced Self-Esteem and Confidence (Depressive Signs/Symptoms)  Outcome: Not Progressing  Flowsheets (Taken 4/7/2025 0122)  Mutually Determined Action Steps (Enhanced Self-Esteem and Confidence): identifies judgmental thoughts  Intervention: Promote Confidence and Self-Esteem  Flowsheets (Taken 4/7/2025 0122)  Supportive Measures:   self-care encouraged   self-reflection promoted   active listening utilized  Goal: Improved Mood Symptoms (Depressive Signs/Symptoms)  Outcome: Not Progressing  Flowsheets (Taken 4/7/2025 0122)  Mutually Determined Action Steps (Improved Mood Symptoms): acknowledges progress  Intervention: Promote Mood Improvement  Flowsheets (Taken 4/7/2025 0122)  Supportive Measures:   self-care encouraged   self-reflection promoted   active listening utilized  Diversional Activity: television  Goal: Optimized Nutrition Intake (Depressive Signs/Symptoms)  Outcome: Not Progressing  Flowsheets (Taken 4/7/2025 0122)  Mutually Determined Action Steps (Optimized Nutrition Intake): eats at meal time  Intervention: Promote Optimal Nutrition Intake  Flowsheets (Taken 4/7/2025 0122)  Nutrition Interventions: food preferences provided  Bowel Elimination Promotion: adequate fluid intake promoted  Goal: Improved Psychomotor Symptoms (Depressive Signs/Symptoms)  Outcome: Not Progressing  Flowsheets (Taken 4/7/2025 0122)  Mutually Determined Action Steps (Improved Psychomotor Symptoms): adheres to medication regimen  Intervention: Manage Psychomotor Movement  Flowsheets (Taken 4/7/2025 0122)  Activity (Behavioral Health): up ad harry  Diversional Activity: television  Goal: Improved Sleep (Depressive Signs/Symptoms)  Outcome: Not Progressing  Flowsheets (Taken 4/7/2025 0122)  Mutually Determined Action Steps (Improved Sleep): sleeps 4-6 hours at night  Intervention: Promote Healthy Sleep Hygiene  Flowsheets (Taken 4/7/2025 0122)  Sleep Hygiene Promotion:   awakenings minimized   regular sleep pattern promoted  Goal:  Enhanced Social, Occupational or Functional Skills (Depressive Signs/Symptoms)  Outcome: Not Progressing  Flowsheets (Taken 4/7/2025 0122)  Mutually Determined Action Steps (Enhanced Social, Occupational or Functional Skills): participates in social skills training  Intervention: Promote Social, Occupational and Functional Ability  Flowsheets (Taken 4/7/2025 0122)  Social Functional Ability Promotion: autonomy promoted     Problem: Suicide Risk  Goal: Absence of Self-Harm  Outcome: Not Progressing  Intervention: Promote Psychosocial Wellbeing  Flowsheets (Taken 4/7/2025 0122)  Sleep/Rest Enhancement:   awakenings minimized   relaxation techniques promoted   regular sleep/rest pattern promoted  Supportive Measures:   self-care encouraged   self-reflection promoted   active listening utilized  Family/Support System Care: self-care encouraged  Intervention: Establish Safety Plan and Continuity of Care  Flowsheets (Taken 4/7/2025 0122)  Safe Transition Promotion: access to lethal means addressed     Problem: Excessive Substance Use  Goal: Optimized Energy Level (Excessive Substance Use)  Outcome: Not Progressing  Flowsheets (Taken 4/7/2025 0122)  Mutually Determined Action Steps (Optimized Energy Level): grooms self without prompting  Intervention: Optimize Energy Level  Flowsheets (Taken 4/7/2025 0122)  Activity (Behavioral Health): up ad harry  Diversional Activity: television  Goal: Improved Behavioral Control (Excessive Substance Use)  Outcome: Not Progressing  Flowsheets (Taken 4/7/2025 0122)  Mutually Determined Action Steps (Improved Behavioral Control):   identifies major stressors   identifies risky behavior  Goal: Increased Participation and Engagement (Excessive Substance Use)  Outcome: Not Progressing  Flowsheets (Taken 4/7/2025 0122)  Mutually Determined Action Steps (Increased Participation and Engagement): identifies support resources  Intervention: Facilitate Participation and Engagement  Flowsheets (Taken  4/7/2025 0122)  Supportive Measures:   self-care encouraged   self-reflection promoted   active listening utilized  Diversional Activity: television  Goal: Improved Physiologic Symptoms (Excessive Substance Use)  Outcome: Not Progressing  Flowsheets (Taken 4/7/2025 0122)  Mutually Determined Action Steps (Improved Physiologic Symptoms): discusses use pattern  Intervention: Optimize Physiologic Function  Flowsheets (Taken 4/7/2025 0122)  Oral Nutrition Promotion: rest periods promoted  Nutrition Interventions: food preferences provided  Goal: Enhanced Social, Occupational or Functional Skills (Excessive Substance Use)  Outcome: Not Progressing  Flowsheets (Taken 4/7/2025 0122)  Mutually Determined Action Steps (Enhanced Social, Occupational or Functional Skills): participates in social skills training  Intervention: Promote Social, Occupational and Functional Ability  Flowsheets (Taken 4/7/2025 0122)  Trust Relationship/Rapport: care explained  Social Functional Ability Promotion: autonomy promoted
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
## 2025-04-07 NOTE — NURSING
At 1249, complaints of mild anxiety and left leg pain, PS 6/10.  Atarax 50 mg., and Tylenol 650 mg., administered.  At 1349, verbalizes no anxiety, and slight relief from discomfort, PS 4/10.

## 2025-04-07 NOTE — PROGRESS NOTES
04/07/25 1500   [facility] Group Therapy   Group Name Therapeutic Recreation   Specific Interventions Skilled Activity Self-Expression   Participation Level Active;Supportive;Appropriate;Attentive;Sharing   Participation Quality Cooperative   Insight/Motivation Limited;Applies New Skills   Affect/Mood Display Appropriate   Cognition Oriented;Alert   Psychomotor WNL
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
## 2025-04-07 NOTE — NURSING
At 1651, complaints of mild anxiety.  Atarax 50 mg., administered.  At 1748, verbalizes no relief from anxiety.

## 2025-04-07 NOTE — NURSING
At 0834, complaints of severe anxiety.  Atarax 50 mg., administered.  At 0919, verbalizes moderate anxiety.

## 2025-04-07 NOTE — PLAN OF CARE
Problem: Adult Behavioral Health Plan of Care  Goal: Plan of Care Review  Outcome: Progressing  Flowsheets (Taken 4/7/2025 0939)  Patient Agreement with Plan of Care: agrees  Plan of Care Reviewed With: patient  Goal: Patient-Specific Goal (Individualization)  Outcome: Progressing  Flowsheets (Taken 4/7/2025 0939)  Patient Personal Strengths: independent living skills  Patient Vulnerabilities: substance abuse/addiction  Goal: Adheres to Safety Considerations for Self and Others  Outcome: Progressing  Flowsheets (Taken 4/7/2025 0939)  Adheres to Safety Considerations for Self and Others: making progress toward outcome  Intervention: Develop and Maintain Individualized Safety Plan  Flowsheets (Taken 4/7/2025 0939)  Safety Measures: safety rounds completed  Goal: Absence of New-Onset Illness or Injury  Outcome: Progressing  Intervention: Identify and Manage Fall Risk  Flowsheets (Taken 4/7/2025 0939)  Safety Measures: safety rounds completed  Intervention: Prevent VTE (Venous Thromboembolism)  Flowsheets (Taken 4/7/2025 0939)  VTE Prevention/Management: fluids promoted  Intervention: Prevent Infection  Flowsheets (Taken 4/7/2025 0939)  Infection Prevention: rest/sleep promoted  Goal: Optimized Coping Skills in Response to Life Stressors  Outcome: Progressing  Flowsheets (Taken 4/7/2025 0939)  Optimized Coping Skills in Response to Life Stressors: making progress toward outcome  Intervention: Promote Effective Coping Strategies  Flowsheets (Taken 4/7/2025 0939)  Supportive Measures: self-care encouraged  Goal: Develops/Participates in Therapeutic [place] to Support Successful Transition  Outcome: Progressing  Flowsheets (Taken 4/7/2025 0939)  Develops/Participates in Therapeutic [place] to Support Successful Transition: making progress toward outcome  Intervention: Foster Therapeutic [place]  Flowsheets (Taken 4/7/2025 0939)  Trust Relationship/Rapport: care explained  Goal: Rounds/Family Conference  Outcome:  Progressing  Flowsheets (Taken 4/7/2025 0939)  Participants:   milieu/psych techs   nursing     Problem: Depressive Signs/Symptoms  Goal: Optimized Energy Level (Depressive Signs/Symptoms)  Outcome: Progressing  Flowsheets (Taken 4/7/2025 0939)  Mutually Determined Action Steps (Optimized Energy Level): grooms self without prompting  Intervention: Optimize Energy Level  Flowsheets (Taken 4/7/2025 0939)  Activity (Behavioral Health): up ad harry  Patient Performed Hygiene: teeth brushed  Diversional Activity: television  Goal: Optimized Cognitive Function (Depressive Signs/Symptoms)  Outcome: Progressing  Flowsheets (Taken 4/7/2025 0939)  Mutually Determined Action Steps (Optimized Cognitive Function): participates in attention training  Goal: Increased Participation and Engagement (Depressive Signs/Symptoms)  Outcome: Progressing  Flowsheets (Taken 4/7/2025 0939)  Mutually Determined Action Steps (Increased Participation and Engagement): participates in one or more activity  Intervention: Facilitate Participation and Engagement  Flowsheets (Taken 4/7/2025 0939)  Supportive Measures: self-care encouraged  Diversional Activity: television  Goal: Enhanced Self-Esteem and Confidence (Depressive Signs/Symptoms)  Outcome: Progressing  Flowsheets (Taken 4/7/2025 0939)  Mutually Determined Action Steps (Enhanced Self-Esteem and Confidence): identifies judgmental thoughts  Intervention: Promote Confidence and Self-Esteem  Flowsheets (Taken 4/7/2025 0939)  Supportive Measures: self-care encouraged  Goal: Improved Mood Symptoms (Depressive Signs/Symptoms)  Outcome: Progressing  Flowsheets (Taken 4/7/2025 0939)  Mutually Determined Action Steps (Improved Mood Symptoms): acknowledges progress  Intervention: Promote Mood Improvement  Flowsheets (Taken 4/7/2025 0939)  Supportive Measures: self-care encouraged  Diversional Activity: television  Goal: Optimized Nutrition Intake (Depressive Signs/Symptoms)  Outcome:  Progressing  Flowsheets (Taken 4/7/2025 0939)  Mutually Determined Action Steps (Optimized Nutrition Intake): eats at meal time  Intervention: Promote Optimal Nutrition Intake  Flowsheets (Taken 4/7/2025 0939)  Nutrition Interventions: food preferences provided  Bowel Elimination Promotion: adequate fluid intake promoted  Goal: Improved Psychomotor Symptoms (Depressive Signs/Symptoms)  Outcome: Progressing  Flowsheets (Taken 4/7/2025 0939)  Mutually Determined Action Steps (Improved Psychomotor Symptoms): adheres to medication regimen  Intervention: Manage Psychomotor Movement  Flowsheets (Taken 4/7/2025 0939)  Activity (Behavioral Health): up ad harry  Patient Performed Hygiene: teeth brushed  Diversional Activity: television  Goal: Improved Sleep (Depressive Signs/Symptoms)  Outcome: Progressing  Flowsheets (Taken 4/7/2025 0939)  Mutually Determined Action Steps (Improved Sleep): sleeps 4-6 hours at night  Intervention: Promote Healthy Sleep Hygiene  Flowsheets (Taken 4/7/2025 0939)  Sleep Hygiene Promotion: regular sleep pattern promoted  Goal: Enhanced Social, Occupational or Functional Skills (Depressive Signs/Symptoms)  Outcome: Progressing  Flowsheets (Taken 4/7/2025 0939)  Mutually Determined Action Steps (Enhanced Social, Occupational or Functional Skills): participates in social skills training  Intervention: Promote Social, Occupational and Functional Ability  Flowsheets (Taken 4/7/2025 0939)  Social Functional Ability Promotion: autonomy promoted     Problem: Suicide Risk  Goal: Absence of Self-Harm  Outcome: Progressing  Intervention: Assess Risk to Self and Maintain Safety  Flowsheets (Taken 4/7/2025 0939)  Behavior Management: behavioral plan reviewed  Self-Harm Prevention: environmental self-harm risks assessed  Intervention: Promote Psychosocial Wellbeing  Flowsheets (Taken 4/7/2025 0939)  Sleep/Rest Enhancement: regular sleep/rest pattern promoted  Supportive Measures: self-care  encouraged  Family/Support System Care: self-care encouraged  Intervention: Establish Safety Plan and Continuity of Care  Flowsheets (Taken 4/7/2025 0939)  Safe Transition Promotion: access to lethal means addressed     Problem: Excessive Substance Use  Goal: Optimized Energy Level (Excessive Substance Use)  Outcome: Progressing  Flowsheets (Taken 4/7/2025 0939)  Mutually Determined Action Steps (Optimized Energy Level): grooms self without prompting  Intervention: Optimize Energy Level  Flowsheets (Taken 4/7/2025 0939)  Activity (Behavioral Health): up ad harry  Patient Performed Hygiene: teeth brushed  Diversional Activity: television  Goal: Improved Behavioral Control (Excessive Substance Use)  Outcome: Progressing  Flowsheets (Taken 4/7/2025 0939)  Mutually Determined Action Steps (Improved Behavioral Control): identifies major stressors  Intervention: Promote Behavior and Impulse Control  Flowsheets (Taken 4/7/2025 0939)  Behavior Management: behavioral plan reviewed  Goal: Increased Participation and Engagement (Excessive Substance Use)  Outcome: Progressing  Flowsheets (Taken 4/7/2025 0939)  Mutually Determined Action Steps (Increased Participation and Engagement): discusses ongoing recovery plan  Intervention: Facilitate Participation and Engagement  Flowsheets (Taken 4/7/2025 0939)  Supportive Measures: self-care encouraged  Diversional Activity: television  Goal: Improved Physiologic Symptoms (Excessive Substance Use)  Outcome: Progressing  Flowsheets (Taken 4/7/2025 0939)  Mutually Determined Action Steps (Improved Physiologic Symptoms): discusses use pattern  Intervention: Optimize Physiologic Function  Flowsheets (Taken 4/7/2025 0939)  Oral Nutrition Promotion: rest periods promoted  Nutrition Interventions: food preferences provided  Goal: Enhanced Social, Occupational or Functional Skills (Excessive Substance Use)  Outcome: Progressing  Flowsheets (Taken 4/7/2025 0939)  Mutually Determined Action Steps  (Enhanced Social, Occupational or Functional Skills): participates in social skills training  Intervention: Promote Social, Occupational and Functional Ability  Flowsheets (Taken 4/7/2025 1308)  Trust Relationship/Rapport: care explained  Social Functional Ability Promotion: autonomy promoted    She is AAO X 4. At present time, she denies SI, HI, hallucinations, and delusions. Speech normal tone and speed. Good eye contact noted. Interacts with selected patients and staff. Tearful. Focused on discharge. Continue plan of care and provide a safe and therapeutic environment. Continue to monitor every fifteen minutes for safety.

## 2025-04-08 PROCEDURE — 25000003 PHARM REV CODE 250: Performed by: PSYCHIATRY & NEUROLOGY

## 2025-04-08 PROCEDURE — 11400000 HC PSYCH PRIVATE ROOM

## 2025-04-08 RX ADMIN — TRAZODONE HYDROCHLORIDE 100 MG: 100 TABLET ORAL at 08:04

## 2025-04-08 RX ADMIN — DESVENLAFAXINE 50 MG: 50 TABLET, FILM COATED, EXTENDED RELEASE ORAL at 08:04

## 2025-04-08 RX ADMIN — HYDROXYZINE HYDROCHLORIDE 50 MG: 50 TABLET ORAL at 08:04

## 2025-04-08 NOTE — PLAN OF CARE
Problem: Adult Behavioral Health Plan of Care  Goal: Plan of Care Review  Flowsheets (Taken 4/8/2025 2596)  Patient Agreement with Plan of Care: agrees  Plan of Care Reviewed With: patient   Upon admission SW/CW reviewed pt care plan during psychosocial assessment and pt verbalized understanding of care plan.

## 2025-04-08 NOTE — PROGRESS NOTES
04/08/25 1000   [facility] Group Therapy   Group Name Therapeutic Recreation   Specific Interventions Skilled Activity Mild Exercises   Participation Level Active;Supportive;Appropriate;Attentive;Sharing   Participation Quality Cooperative;Social   Insight/Motivation Improved;Applies New Skills   Affect/Mood Display Appropriate   Cognition Oriented;Alert   Psychomotor WNL
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
## 2025-04-08 NOTE — NURSING
2119 Pt requested PRN anxiety and sleep med. Administered atarax 50 mg po and trazodone 100 mg po.       2149 Pt in bed, eyes closed, resting quietly with no signs of anxiety noted.

## 2025-04-08 NOTE — PROGRESS NOTES
04/08/25 [address] [place] [address] Group Therapy   Group Name Therapeutic Recreation   Specific Interventions Skilled Activity Leisure Education and Awareness   Participation Level Active;Attentive;Supportive;Appropriate;Sharing   Participation Quality Cooperative;Social   Insight/Motivation Improved;Applies New Skills   Affect/Mood Display Appropriate;Bright   Cognition Oriented;Alert   Psychomotor WNL

## 2025-04-08 NOTE — PLAN OF CARE
Problem: Adult Behavioral Health Plan of Care  Goal: Plan of Care Review  Outcome: Progressing  Flowsheets (Taken 4/8/2025 0544)  Patient Agreement with Plan of Care: agrees  Plan of Care Reviewed With: patient  Goal: Patient-Specific Goal (Individualization)  Outcome: Progressing  Flowsheets (Taken 4/8/2025 0544)  Patient Personal Strengths: independent living skills  Patient Vulnerabilities: substance abuse/addiction  Goal: Adheres to Safety Considerations for Self and Others  Outcome: Progressing  Flowsheets (Taken 4/8/2025 0544)  Adheres to Safety Considerations for Self and Others: making progress toward outcome  Intervention: Develop and Maintain Individualized Safety Plan  Flowsheets (Taken 4/8/2025 0544)  Safety Measures: suicide assessment completed  Goal: Absence of New-Onset Illness or Injury  Outcome: Progressing  Intervention: Identify and Manage Fall Risk  Flowsheets (Taken 4/8/2025 0544)  Safety Measures: suicide assessment completed  Intervention: Prevent Skin Injury  Flowsheets (Taken 4/8/2025 0544)  Device Skin Pressure Protection: adhesive use limited  Intervention: Prevent VTE (Venous Thromboembolism)  Flowsheets (Taken 4/8/2025 0544)  VTE Prevention/Management: fluids promoted  Intervention: Prevent Infection  Flowsheets (Taken 4/8/2025 0544)  Infection Prevention: rest/sleep promoted  Goal: Optimized Coping Skills in Response to Life Stressors  Outcome: Progressing  Flowsheets (Taken 4/8/2025 0544)  Optimized Coping Skills in Response to Life Stressors: making progress toward outcome  Intervention: Promote Effective Coping Strategies  Flowsheets (Taken 4/8/2025 0544)  Supportive Measures: self-reflection promoted  Goal: Develops/Participates in Therapeutic [place] to Support Successful Transition  Outcome: Progressing  Flowsheets (Taken 4/8/2025 0544)  Develops/Participates in Therapeutic [place] to Support Successful Transition: making progress toward outcome  Intervention: Foster  Therapeutic [place]  Flowsheets (Taken 4/8/2025 0544)  Trust Relationship/Rapport: care explained  Goal: Rounds/Family Conference  Outcome: Progressing     Problem: Depressive Signs/Symptoms  Goal: Optimized Energy Level (Depressive Signs/Symptoms)  Outcome: Progressing  Flowsheets (Taken 4/8/2025 0544)  Mutually Determined Action Steps (Optimized Energy Level): grooms self without prompting  Intervention: Optimize Energy Level  Flowsheets (Taken 4/8/2025 0544)  Activity (Behavioral Health): up ad harry  Diversional Activity: television  Goal: Optimized Cognitive Function (Depressive Signs/Symptoms)  Outcome: Progressing  Flowsheets (Taken 4/8/2025 0544)  Mutually Determined Action Steps (Optimized Cognitive Function): participates in attention training  Goal: Increased Participation and Engagement (Depressive Signs/Symptoms)  Outcome: Progressing  Flowsheets (Taken 4/8/2025 0544)  Mutually Determined Action Steps (Increased Participation and Engagement): participates in one or more activity  Intervention: Facilitate Participation and Engagement  Flowsheets (Taken 4/8/2025 0544)  Supportive Measures: self-reflection promoted  Diversional Activity: television  Goal: Enhanced Self-Esteem and Confidence (Depressive Signs/Symptoms)  Outcome: Progressing  Flowsheets (Taken 4/8/2025 0544)  Mutually Determined Action Steps (Enhanced Self-Esteem and Confidence): identifies judgmental thoughts  Intervention: Promote Confidence and Self-Esteem  Flowsheets (Taken 4/8/2025 0544)  Supportive Measures: self-reflection promoted  Goal: Improved Mood Symptoms (Depressive Signs/Symptoms)  Outcome: Progressing  Flowsheets (Taken 4/8/2025 0544)  Mutually Determined Action Steps (Improved Mood Symptoms): acknowledges progress  Intervention: Promote Mood Improvement  Flowsheets (Taken 4/8/2025 0544)  Supportive Measures: self-reflection promoted  Diversional Activity: television  Goal: Optimized Nutrition Intake (Depressive  Signs/Symptoms)  Outcome: Progressing  Flowsheets (Taken 4/8/2025 0544)  Mutually Determined Action Steps (Optimized Nutrition Intake): eats at meal time  Intervention: Promote Optimal Nutrition Intake  Flowsheets (Taken 4/8/2025 0544)  Nutrition Interventions: food preferences provided  Bowel Elimination Promotion: adequate fluid intake promoted  Goal: Improved Psychomotor Symptoms (Depressive Signs/Symptoms)  Outcome: Progressing  Flowsheets (Taken 4/8/2025 0544)  Mutually Determined Action Steps (Improved Psychomotor Symptoms): adheres to medication regimen  Intervention: Manage Psychomotor Movement  Flowsheets (Taken 4/8/2025 0544)  Activity (Behavioral Health): up ad harry  Diversional Activity: television  Goal: Improved Sleep (Depressive Signs/Symptoms)  Outcome: Progressing  Flowsheets (Taken 4/8/2025 0544)  Mutually Determined Action Steps (Improved Sleep): sleeps 4-6 hours at night  Intervention: Promote Healthy Sleep Hygiene  Flowsheets (Taken 4/8/2025 0544)  Sleep Hygiene Promotion: regular sleep pattern promoted  Goal: Enhanced Social, Occupational or Functional Skills (Depressive Signs/Symptoms)  Outcome: Progressing  Flowsheets (Taken 4/8/2025 0544)  Mutually Determined Action Steps (Enhanced Social, Occupational or Functional Skills): participates in social skills training  Intervention: Promote Social, Occupational and Functional Ability  Flowsheets (Taken 4/8/2025 0544)  Social Functional Ability Promotion: autonomy promoted     Problem: Suicide Risk  Goal: Absence of Self-Harm  Outcome: Progressing  Intervention: Assess Risk to Self and Maintain Safety  Flowsheets (Taken 4/8/2025 0544)  Behavior Management: behavioral plan reviewed  Enhanced Safety Measures: monitored by video  Self-Harm Prevention: environmental self-harm risks assessed  Intervention: Promote Psychosocial Wellbeing  Flowsheets (Taken 4/8/2025 0544)  Sleep/Rest Enhancement: regular sleep/rest pattern promoted  Supportive Measures:  self-reflection promoted  Family/Support System Care: caregiver stress acknowledged  Intervention: Establish Safety Plan and Continuity of Care  Flowsheets (Taken 4/8/2025 0544)  Safe Transition Promotion: access to lethal means addressed     Problem: Excessive Substance Use  Goal: Optimized Energy Level (Excessive Substance Use)  Outcome: Progressing  Flowsheets (Taken 4/8/2025 0544)  Mutually Determined Action Steps (Optimized Energy Level): grooms self without prompting  Intervention: Optimize Energy Level  Flowsheets (Taken 4/8/2025 0544)  Activity (Behavioral Health): up ad harry  Diversional Activity: television  Goal: Improved Behavioral Control (Excessive Substance Use)  Outcome: Progressing  Flowsheets (Taken 4/8/2025 0544)  Mutually Determined Action Steps (Improved Behavioral Control): identifies major stressors  Intervention: Promote Behavior and Impulse Control  Flowsheets (Taken 4/8/2025 0544)  Behavior Management: behavioral plan reviewed  Goal: Increased Participation and Engagement (Excessive Substance Use)  Outcome: Progressing  Flowsheets (Taken 4/8/2025 0544)  Mutually Determined Action Steps (Increased Participation and Engagement): discusses ongoing recovery plan  Intervention: Facilitate Participation and Engagement  Flowsheets (Taken 4/8/2025 0544)  Supportive Measures: self-reflection promoted  Diversional Activity: television  Goal: Improved Physiologic Symptoms (Excessive Substance Use)  Outcome: Progressing  Flowsheets (Taken 4/8/2025 0544)  Mutually Determined Action Steps (Improved Physiologic Symptoms): discusses use pattern  Intervention: Optimize Physiologic Function  Flowsheets (Taken 4/8/2025 0544)  Oral Nutrition Promotion: rest periods promoted  Nutrition Interventions: food preferences provided  Goal: Enhanced Social, Occupational or Functional Skills (Excessive Substance Use)  Outcome: Progressing  Flowsheets (Taken 4/8/2025 0544)  Mutually Determined Action Steps (Enhanced  Social, Occupational or Functional Skills): participates in social skills training  Intervention: Promote Social, Occupational and Functional Ability  Flowsheets (Taken 4/8/2025 2668)  Trust Relationship/Rapport: care explained  Social Functional Ability Promotion: autonomy promoted   AAO X 4. Denies SI, HI, hallucinations, and delusions. Speech normal tone and speed. Good eye contact noted. Interacts with selected patients and staff. Tearful. Focused on discharge. Continue plan of care and provide a safe and therapeutic environment. Continue to monitor every fifteen minutes for safety.

## 2025-04-08 NOTE — PLAN OF CARE
Problem: Adult Behavioral Health Plan of Care  Goal: Plan of Care Review  Outcome: Progressing  Flowsheets (Taken 4/8/2025 0948)  Patient Agreement with Plan of Care: agrees  Plan of Care Reviewed With: patient  Goal: Patient-Specific Goal (Individualization)  Outcome: Progressing  Flowsheets (Taken 4/8/2025 0948)  Patient Personal Strengths: independent living skills  Patient Vulnerabilities: substance abuse/addiction  Goal: Adheres to Safety Considerations for Self and Others  Outcome: Progressing  Flowsheets (Taken 4/8/2025 0948)  Adheres to Safety Considerations for Self and Others: making progress toward outcome  Intervention: Develop and Maintain Individualized Safety Plan  Flowsheets (Taken 4/8/2025 0948)  Safety Measures: safety rounds completed  Goal: Absence of New-Onset Illness or Injury  Outcome: Progressing  Intervention: Identify and Manage Fall Risk  Flowsheets (Taken 4/8/2025 0948)  Safety Measures: safety rounds completed  Intervention: Prevent VTE (Venous Thromboembolism)  Flowsheets (Taken 4/8/2025 0948)  VTE Prevention/Management: fluids promoted  Intervention: Prevent Infection  Flowsheets (Taken 4/8/2025 0948)  Infection Prevention: hand hygiene promoted  Goal: Optimized Coping Skills in Response to Life Stressors  Outcome: Progressing  Flowsheets (Taken 4/8/2025 0948)  Optimized Coping Skills in Response to Life Stressors: making progress toward outcome  Intervention: Promote Effective Coping Strategies  Flowsheets (Taken 4/8/2025 0948)  Supportive Measures: self-care encouraged  Goal: Develops/Participates in Therapeutic [place] to Support Successful Transition  Outcome: Progressing  Flowsheets (Taken 4/8/2025 0948)  Develops/Participates in Therapeutic [place] to Support Successful Transition: making progress toward outcome  Intervention: Foster Therapeutic [place]  Flowsheets (Taken 4/8/2025 0948)  Trust Relationship/Rapport: care explained  Goal: Rounds/Family Conference  Outcome:  Progressing  Flowsheets (Taken 4/8/2025 0948)  Participants:   milieu/psych techs   nursing     Problem: Depressive Signs/Symptoms  Goal: Optimized Energy Level (Depressive Signs/Symptoms)  Outcome: Progressing  Flowsheets (Taken 4/8/2025 0948)  Mutually Determined Action Steps (Optimized Energy Level): grooms self without prompting  Intervention: Optimize Energy Level  Flowsheets (Taken 4/8/2025 0948)  Activity (Behavioral Health): up ad harry  Patient Performed Hygiene: teeth brushed  Diversional Activity: television  Goal: Optimized Cognitive Function (Depressive Signs/Symptoms)  Outcome: Progressing  Flowsheets (Taken 4/8/2025 0948)  Mutually Determined Action Steps (Optimized Cognitive Function): participates in attention training  Goal: Increased Participation and Engagement (Depressive Signs/Symptoms)  Outcome: Progressing  Flowsheets (Taken 4/8/2025 0948)  Mutually Determined Action Steps (Increased Participation and Engagement): participates in one or more activity  Intervention: Facilitate Participation and Engagement  Flowsheets (Taken 4/8/2025 0948)  Supportive Measures: self-care encouraged  Diversional Activity: television  Goal: Enhanced Self-Esteem and Confidence (Depressive Signs/Symptoms)  Outcome: Progressing  Flowsheets (Taken 4/8/2025 0948)  Mutually Determined Action Steps (Enhanced Self-Esteem and Confidence): identifies judgmental thoughts  Intervention: Promote Confidence and Self-Esteem  Flowsheets (Taken 4/8/2025 0948)  Supportive Measures: self-care encouraged  Goal: Improved Mood Symptoms (Depressive Signs/Symptoms)  Outcome: Progressing  Flowsheets (Taken 4/8/2025 0948)  Mutually Determined Action Steps (Improved Mood Symptoms): acknowledges progress  Intervention: Promote Mood Improvement  Flowsheets (Taken 4/8/2025 0948)  Supportive Measures: self-care encouraged  Diversional Activity: television  Goal: Optimized Nutrition Intake (Depressive Signs/Symptoms)  Outcome:  Progressing  Flowsheets (Taken 4/8/2025 0948)  Mutually Determined Action Steps (Optimized Nutrition Intake): eats at meal time  Intervention: Promote Optimal Nutrition Intake  Flowsheets (Taken 4/8/2025 0948)  Nutrition Interventions: food preferences provided  Bowel Elimination Promotion: adequate fluid intake promoted  Goal: Improved Psychomotor Symptoms (Depressive Signs/Symptoms)  Outcome: Progressing  Flowsheets (Taken 4/8/2025 0948)  Mutually Determined Action Steps (Improved Psychomotor Symptoms): adheres to medication regimen  Intervention: Manage Psychomotor Movement  Flowsheets (Taken 4/8/2025 0948)  Activity (Behavioral Health): up ad harry  Patient Performed Hygiene: teeth brushed  Diversional Activity: television  Goal: Improved Sleep (Depressive Signs/Symptoms)  Outcome: Progressing  Flowsheets (Taken 4/8/2025 0948)  Mutually Determined Action Steps (Improved Sleep): sleeps 4-6 hours at night  Intervention: Promote Healthy Sleep Hygiene  Flowsheets (Taken 4/8/2025 0948)  Sleep Hygiene Promotion: regular sleep pattern promoted  Goal: Enhanced Social, Occupational or Functional Skills (Depressive Signs/Symptoms)  Outcome: Progressing  Flowsheets (Taken 4/8/2025 0948)  Mutually Determined Action Steps (Enhanced Social, Occupational or Functional Skills): participates in social skills training  Intervention: Promote Social, Occupational and Functional Ability  Flowsheets (Taken 4/8/2025 0948)  Social Functional Ability Promotion: autonomy promoted     Problem: Suicide Risk  Goal: Absence of Self-Harm  Outcome: Progressing  Intervention: Assess Risk to Self and Maintain Safety  Flowsheets (Taken 4/8/2025 0948)  Behavior Management: behavioral plan reviewed  Self-Harm Prevention: environmental self-harm risks assessed  Intervention: Promote Psychosocial Wellbeing  Flowsheets (Taken 4/8/2025 0948)  Sleep/Rest Enhancement: regular sleep/rest pattern promoted  Supportive Measures: self-care  encouraged  Family/Support System Care: self-care encouraged  Intervention: Establish Safety Plan and Continuity of Care  Flowsheets (Taken 4/8/2025 0948)  Safe Transition Promotion: access to lethal means addressed     Problem: Excessive Substance Use  Goal: Optimized Energy Level (Excessive Substance Use)  Outcome: Progressing  Flowsheets (Taken 4/8/2025 0948)  Mutually Determined Action Steps (Optimized Energy Level): grooms self without prompting  Intervention: Optimize Energy Level  Flowsheets (Taken 4/8/2025 0948)  Activity (Behavioral Health): up ad harry  Patient Performed Hygiene: teeth brushed  Diversional Activity: television  Goal: Improved Behavioral Control (Excessive Substance Use)  Outcome: Progressing  Flowsheets (Taken 4/8/2025 0948)  Mutually Determined Action Steps (Improved Behavioral Control): identifies major stressors  Intervention: Promote Behavior and Impulse Control  Flowsheets (Taken 4/8/2025 0948)  Behavior Management: behavioral plan reviewed  Goal: Increased Participation and Engagement (Excessive Substance Use)  Outcome: Progressing  Flowsheets (Taken 4/8/2025 0948)  Mutually Determined Action Steps (Increased Participation and Engagement): discusses ongoing recovery plan  Intervention: Facilitate Participation and Engagement  Flowsheets (Taken 4/8/2025 0948)  Supportive Measures: self-care encouraged  Diversional Activity: television  Goal: Improved Physiologic Symptoms (Excessive Substance Use)  Outcome: Progressing  Flowsheets (Taken 4/8/2025 0948)  Mutually Determined Action Steps (Improved Physiologic Symptoms): discusses use pattern  Intervention: Optimize Physiologic Function  Flowsheets (Taken 4/8/2025 0948)  Oral Nutrition Promotion: rest periods promoted  Nutrition Interventions: food preferences provided  Goal: Enhanced Social, Occupational or Functional Skills (Excessive Substance Use)  Outcome: Progressing  Flowsheets (Taken 4/8/2025 0948)  Mutually Determined Action Steps  (Enhanced Social, Occupational or Functional Skills): participates in social skills training  Intervention: Promote Social, Occupational and Functional Ability  Flowsheets (Taken 4/8/2025 3421)  Trust Relationship/Rapport: care explained  Social Functional Ability Promotion: autonomy promoted    She is AAO X 4. Focused on discharge. Tearful because she states she doesn't need to be inpatient at [facility]. Good eye contact noted. Speech normal tone and speed. Interacts with selected patients and staff. Denies SI, HI, hallucinations, and delusions. Continue plan of care and provide a safe and therapeutic environment. Continue to monitor every fifteen minutes for safety.

## 2025-04-08 NOTE — NURSING
At 0806, complaints of moderate anxiety.  Atarax 50 mg., administered.  At 0906, verbalizes no anxiety.

## 2025-04-09 PROCEDURE — 25000003 PHARM REV CODE 250: Performed by: PSYCHIATRY & NEUROLOGY

## 2025-04-09 PROCEDURE — 11400000 HC PSYCH PRIVATE ROOM

## 2025-04-09 RX ORDER — THIAMINE HCL 100 MG
100 TABLET ORAL DAILY
Status: DISCONTINUED | OUTPATIENT
Start: 2025-04-09 | End: 2025-04-10 | Stop reason: HOSPADM

## 2025-04-09 RX ORDER — LORAZEPAM 0.5 MG/1
0.5 TABLET ORAL DAILY
Status: DISCONTINUED | OUTPATIENT
Start: 2025-04-13 | End: 2025-04-10

## 2025-04-09 RX ORDER — LORAZEPAM 0.5 MG/1
0.5 TABLET ORAL 3 TIMES DAILY
Status: DISCONTINUED | OUTPATIENT
Start: 2025-04-09 | End: 2025-04-10

## 2025-04-09 RX ORDER — LORAZEPAM 0.5 MG/1
0.5 TABLET ORAL 2 TIMES DAILY
Status: DISCONTINUED | OUTPATIENT
Start: 2025-04-11 | End: 2025-04-10

## 2025-04-09 RX ORDER — FOLIC ACID 1 MG/1
1 TABLET ORAL DAILY
Status: DISCONTINUED | OUTPATIENT
Start: 2025-04-09 | End: 2025-04-10 | Stop reason: HOSPADM

## 2025-04-09 RX ADMIN — HYDROXYZINE HYDROCHLORIDE 50 MG: 50 TABLET ORAL at 08:04

## 2025-04-09 RX ADMIN — FOLIC ACID 1 MG: 1 TABLET ORAL at 11:04

## 2025-04-09 RX ADMIN — HYDROXYZINE HYDROCHLORIDE 50 MG: 50 TABLET ORAL at 11:04

## 2025-04-09 RX ADMIN — TRAZODONE HYDROCHLORIDE 100 MG: 100 TABLET ORAL at 08:04

## 2025-04-09 RX ADMIN — THERA TABS 1 TABLET: TAB at 11:04

## 2025-04-09 RX ADMIN — THIAMINE HCL TAB 100 MG 100 MG: 100 TAB at 11:04

## 2025-04-09 RX ADMIN — DESVENLAFAXINE 50 MG: 50 TABLET, FILM COATED, EXTENDED RELEASE ORAL at 09:04

## 2025-04-09 NOTE — PLAN OF CARE
Problem: Adult Behavioral Health Plan of Care  Goal: Plan of Care Review  Outcome: Progressing  Flowsheets (Taken 4/9/2025 0308)  Patient Agreement with Plan of Care: agrees  Plan of Care Reviewed With: patient  Goal: Patient-Specific Goal (Individualization)  Outcome: Progressing  Flowsheets (Taken 4/9/2025 0308)  Patient Personal Strengths: independent living skills  Patient Vulnerabilities: substance abuse/addiction  Goal: Adheres to Safety Considerations for Self and Others  Outcome: Progressing  Flowsheets (Taken 4/9/2025 0308)  Adheres to Safety Considerations for Self and Others: making progress toward outcome  Intervention: Develop and Maintain Individualized Safety Plan  Flowsheets (Taken 4/9/2025 0308)  Safety Measures:   safety plan reviewed   safety rounds completed  Goal: Absence of New-Onset Illness or Injury  Outcome: Progressing  Intervention: Identify and Manage Fall Risk  Flowsheets (Taken 4/9/2025 0308)  Safety Measures:   safety plan reviewed   safety rounds completed  Intervention: Prevent Skin Injury  Flowsheets (Taken 4/9/2025 0308)  Device Skin Pressure Protection: adhesive use limited  Intervention: Prevent Infection  Flowsheets (Taken 4/9/2025 0308)  Infection Prevention:   rest/sleep promoted   hand hygiene promoted  Goal: Optimized Coping Skills in Response to Life Stressors  Outcome: Progressing  Flowsheets (Taken 4/9/2025 0308)  Optimized Coping Skills in Response to Life Stressors: making progress toward outcome  Intervention: Promote Effective Coping Strategies  Flowsheets (Taken 4/9/2025 0308)  Supportive Measures: self-care encouraged  Goal: Develops/Participates in Therapeutic [place] to Support Successful Transition  Outcome: Progressing  Flowsheets (Taken 4/9/2025 0308)  Develops/Participates in Therapeutic [place] to Support Successful Transition: making progress toward outcome  Intervention: Foster Therapeutic [place]  Flowsheets (Taken 4/9/2025 0308)  Trust  Relationship/Rapport: care explained  Goal: Rounds/Family Conference  Outcome: Progressing     Problem: Depressive Signs/Symptoms  Goal: Optimized Energy Level (Depressive Signs/Symptoms)  Outcome: Progressing  Intervention: Optimize Energy Level  Flowsheets (Taken 4/9/2025 0308)  Activity (Behavioral Health): up ad harry  Diversional Activity: television  Goal: Optimized Cognitive Function (Depressive Signs/Symptoms)  Outcome: Progressing  Flowsheets (Taken 4/9/2025 0308)  Mutually Determined Action Steps (Optimized Cognitive Function): participates in attention training  Goal: Increased Participation and Engagement (Depressive Signs/Symptoms)  Outcome: Progressing  Flowsheets (Taken 4/9/2025 0308)  Mutually Determined Action Steps (Increased Participation and Engagement): participates in one or more activity  Intervention: Facilitate Participation and Engagement  Flowsheets (Taken 4/9/2025 0308)  Supportive Measures: self-care encouraged  Diversional Activity: television  Goal: Enhanced Self-Esteem and Confidence (Depressive Signs/Symptoms)  Outcome: Progressing  Flowsheets (Taken 4/9/2025 0308)  Mutually Determined Action Steps (Enhanced Self-Esteem and Confidence): identifies judgmental thoughts  Intervention: Promote Confidence and Self-Esteem  Flowsheets (Taken 4/9/2025 0308)  Supportive Measures: self-care encouraged  Goal: Improved Mood Symptoms (Depressive Signs/Symptoms)  Outcome: Progressing  Flowsheets (Taken 4/9/2025 0308)  Mutually Determined Action Steps (Improved Mood Symptoms): acknowledges progress  Intervention: Promote Mood Improvement  Flowsheets (Taken 4/9/2025 0308)  Supportive Measures: self-care encouraged  Diversional Activity: television  Goal: Optimized Nutrition Intake (Depressive Signs/Symptoms)  Outcome: Progressing  Flowsheets (Taken 4/9/2025 0308)  Mutually Determined Action Steps (Optimized Nutrition Intake): eats at meal time  Intervention: Promote Optimal Nutrition  Intake  Flowsheets (Taken 4/9/2025 0308)  Nutrition Interventions: food preferences provided  Bowel Elimination Promotion: adequate fluid intake promoted  Goal: Improved Psychomotor Symptoms (Depressive Signs/Symptoms)  Outcome: Progressing  Flowsheets (Taken 4/9/2025 0308)  Mutually Determined Action Steps (Improved Psychomotor Symptoms): adheres to medication regimen  Intervention: Manage Psychomotor Movement  Flowsheets (Taken 4/9/2025 0308)  Activity (Behavioral Health): up ad harry  Diversional Activity: television  Goal: Improved Sleep (Depressive Signs/Symptoms)  Outcome: Progressing  Flowsheets (Taken 4/9/2025 0308)  Mutually Determined Action Steps (Improved Sleep): sleeps 4-6 hours at night  Intervention: Promote Healthy Sleep Hygiene  Flowsheets (Taken 4/9/2025 0308)  Sleep Hygiene Promotion: awakenings minimized  Goal: Enhanced Social, Occupational or Functional Skills (Depressive Signs/Symptoms)  Outcome: Progressing  Flowsheets (Taken 4/9/2025 0308)  Mutually Determined Action Steps (Enhanced Social, Occupational or Functional Skills): participates in social skills training  Intervention: Promote Social, Occupational and Functional Ability  Flowsheets (Taken 4/9/2025 0308)  Social Functional Ability Promotion: autonomy promoted     Problem: Suicide Risk  Goal: Absence of Self-Harm  Outcome: Progressing  Intervention: Assess Risk to Self and Maintain Safety  Flowsheets (Taken 4/9/2025 0308)  Behavior Management: behavioral plan reviewed  Enhanced Safety Measures: monitored by video  Intervention: Promote Psychosocial Wellbeing  Flowsheets (Taken 4/9/2025 0308)  Sleep/Rest Enhancement: regular sleep/rest pattern promoted  Supportive Measures: self-care encouraged  Family/Support System Care: self-care encouraged     Problem: Excessive Substance Use  Goal: Optimized Energy Level (Excessive Substance Use)  Outcome: Progressing  Intervention: Optimize Energy Level  Flowsheets (Taken 4/9/2025 0308)  Activity  (Behavioral Health): up ad harry  Diversional Activity: television  Goal: Improved Behavioral Control (Excessive Substance Use)  Outcome: Progressing  Flowsheets (Taken 4/9/2025 0308)  Mutually Determined Action Steps (Improved Behavioral Control): identifies risky behavior  Intervention: Promote Behavior and Impulse Control  Flowsheets (Taken 4/9/2025 0308)  Behavior Management: behavioral plan reviewed  Goal: Increased Participation and Engagement (Excessive Substance Use)  Outcome: Progressing  Intervention: Facilitate Participation and Engagement  Flowsheets (Taken 4/9/2025 0308)  Supportive Measures: self-care encouraged  Diversional Activity: television  Goal: Improved Physiologic Symptoms (Excessive Substance Use)  Outcome: Progressing  Flowsheets (Taken 4/9/2025 0308)  Mutually Determined Action Steps (Improved Physiologic Symptoms): discusses use pattern  Intervention: Optimize Physiologic Function  Flowsheets (Taken 4/9/2025 0308)  Oral Nutrition Promotion: rest periods promoted  Nutrition Interventions: food preferences provided  Goal: Enhanced Social, Occupational or Functional Skills (Excessive Substance Use)  Outcome: Progressing  Flowsheets (Taken 4/9/2025 0308)  Mutually Determined Action Steps (Enhanced Social, Occupational or Functional Skills): participates in social skills training  Intervention: Promote Social, Occupational and Functional Ability  Flowsheets (Taken 4/9/2025 0308)  Trust Relationship/Rapport: care explained  Social Functional Ability Promotion: autonomy promoted   AAO X 4. Denies SI, HI, hallucinations, and delusions. Speech normal tone and speed. Good eye contact noted. Interacts with selected patients and staff. Mood improving. Focused on discharge. Continue plan of care and provide a safe and therapeutic environment. Continue to monitor every fifteen minutes for safety.

## 2025-04-09 NOTE — NURSING
2005 Pt requested PRN sleep med, administered trazodone 100 mg po.     2025 Pt in bed, eyes closed, resting quietly.

## 2025-04-09 NOTE — PLAN OF CARE
Problem: Adult Behavioral Health Plan of Care  Goal: Plan of Care Review  Flowsheets (Taken 4/9/2025 1049)  Patient Agreement with Plan of Care: agrees  Plan of Care Reviewed With: patient   Patient met with treatment team to discuss care plan. Patient agreed to current care plan. Patient verbalized understanding of discharge plans to home with follow up with Dr. Jamin Garland and Margot Parry.       Brenda Harmon \[facility] [place] [facility]\""W-BACS
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
## 2025-04-09 NOTE — PROGRESS NOTES
04/09/25 [address] [place] [address] Group Therapy   Group Name Therapeutic Recreation   Specific Interventions Skilled Activity Self-Expression   Participation Level Active;Supportive;Appropriate;Sharing;Attentive   Participation Quality Cooperative;Social   Insight/Motivation Good;Applies New Skills   Affect/Mood Display Appropriate;Bright   Cognition Oriented;Alert   Psychomotor WNL

## 2025-04-09 NOTE — PLAN OF CARE
Jaylin attended interdisciplinary treatment team, was pleasant, cooperative, and bright, reporting I'm good, dealing with everything better than I thought, and is progressing towards reported treatment goal of Better Self-esteem. CTRS reported to interdisciplinary team that Jaylin attends and participates in TR groups, is pleasant and cooperative, interacts well with peers and staff, and attends her ADL's with improved insight.

## 2025-04-09 NOTE — NURSING
Patient alert and oriented x 4. Denies SI and HI.Denies visual and auditory hallucination. Denies anxiety and depression. Patient with no noted tremors . Ambulatory with steady gait.

## 2025-04-09 NOTE — PLAN OF CARE
Problem: Adult Behavioral Health Plan of Care  Goal: Plan of Care Review  Outcome: Progressing  Flowsheets (Taken 4/9/2025 1832)  Patient Agreement with Plan of Care: agrees  Plan of Care Reviewed With: patient  Goal: Patient-Specific Goal (Individualization)  Outcome: Progressing  Flowsheets (Taken 4/9/2025 1832)  Patient Personal Strengths: positive attitude  Patient Vulnerabilities: substance abuse/addiction  Goal: Adheres to Safety Considerations for Self and Others  Outcome: Progressing  Flowsheets (Taken 4/9/2025 1832)  Adheres to Safety Considerations for Self and Others: making progress toward outcome     Problem: Depressive Signs/Symptoms  Goal: Optimized Energy Level (Depressive Signs/Symptoms)  Outcome: Progressing  Flowsheets (Taken 4/9/2025 1832)  Mutually Determined Action Steps (Optimized Energy Level): grooms self without prompting  Goal: Increased Participation and Engagement (Depressive Signs/Symptoms)  Outcome: Progressing  Flowsheets (Taken 4/9/2025 1832)  Mutually Determined Action Steps (Increased Participation and Engagement): participates in one or more activity  Goal: Improved Sleep (Depressive Signs/Symptoms)  Outcome: Progressing  Flowsheets (Taken 4/9/2025 1832)  Mutually Determined Action Steps (Improved Sleep): sleeps 4-6 hours at night

## 2025-04-10 VITALS
WEIGHT: 212.38 LBS | OXYGEN SATURATION: 96 % | HEART RATE: 82 BPM | SYSTOLIC BLOOD PRESSURE: 120 MMHG | BODY MASS INDEX: 34.13 KG/M2 | TEMPERATURE: 98 F | RESPIRATION RATE: 18 BRPM | DIASTOLIC BLOOD PRESSURE: 89 MMHG | HEIGHT: 66 IN

## 2025-04-10 PROCEDURE — 25000003 PHARM REV CODE 250: Performed by: PSYCHIATRY & NEUROLOGY

## 2025-04-10 RX ORDER — DESVENLAFAXINE 50 MG/1
50 TABLET, FILM COATED, EXTENDED RELEASE ORAL DAILY
Qty: 30 TABLET | Refills: 0 | Status: SHIPPED | OUTPATIENT
Start: 2025-04-10 | End: 2025-05-10

## 2025-04-10 RX ORDER — IBUPROFEN 200 MG
1 TABLET ORAL DAILY
Qty: 28 PATCH | Refills: 0 | Status: SHIPPED | OUTPATIENT
Start: 2025-04-11 | End: 2025-05-09

## 2025-04-10 RX ADMIN — DESVENLAFAXINE 50 MG: 50 TABLET, FILM COATED, EXTENDED RELEASE ORAL at 08:04

## 2025-04-10 RX ADMIN — FOLIC ACID 1 MG: 1 TABLET ORAL at 08:04

## 2025-04-10 RX ADMIN — HYDROXYZINE HYDROCHLORIDE 50 MG: 50 TABLET ORAL at 08:04

## 2025-04-10 RX ADMIN — THIAMINE HCL TAB 100 MG 100 MG: 100 TAB at 08:04

## 2025-04-10 RX ADMIN — THERA TABS 1 TABLET: TAB at 08:04

## 2025-04-10 NOTE — NURSING
2005 Pt refused scheduled ativan dose and requested PRN sleep and anxiety meds. Administered atarax 50 mg po and trazodone 100 mg po.     2035 Pt in day room watching tv

## 2025-04-10 NOTE — PROGRESS NOTES
04/10/25 1000   [facility] Group Therapy   Group Name Therapeutic Recreation   Specific Interventions Skilled Activity Mild Exercises   Participation Level Active;Supportive;Appropriate;Attentive;Sharing   Participation Quality Cooperative;Social   Insight/Motivation Good;Applies New Skills   Affect/Mood Display Appropriate;Bright   Cognition Oriented;Alert   Psychomotor WNL

## 2025-04-10 NOTE — GROUP NOTE
Group Psychotherapy       Group Focus: Promoting Healthy Lifestyles  Group topic: Discharge Planning. Therapist explored patients need for identifying personal strengths and qualities in working towards mental health goals. Patients were given the opportunity to engage in written worksheets to gain more insight on their personal dc plan.         Number of patients in attendance: 5  Group Start Time: 1045  Group End Time:  1130  Groups Date: 4/10/2025  Group Topic:  Behavioral Health  [facility]  Group Facilitators:  Lauryn Urias MSW  _____________________________________________________________________    Patient Name: Jaylin Olivia  MRN: [id number]  Patient Class: IP- Psych   Admission Date\Time: 4/7/2025  1:11 AM  Hospital Length of Stay: 3  Primary Care Provider: No primary care provider on file.     Referred by: Acute Psychiatry Unit Treatment Team     Target symptoms: Depression and Poor Coping Skills     Patient's response to treatment: Active Listening and Self-disclosure     Progress toward goals: Progressing well    Plan: Continue treatment on [facility]

## 2025-04-10 NOTE — NURSING
Discharge Note:    Jaylin Olivia is a 29 y.o. female, : 1995, MRN: [id number], admitted on 2025 for Darío Roca MD with a diagnosis of Depression [F32.A].    Patient discharged on 4/10/2025 per physician orders in stable condition. Patient denied suicidal ideation, homicidal ideation, or hallucinations. Patient was discharged with valuables, personal belongings, prescriptions, discharge instructions, printed Warning Sings of Self-Harm pursuant to Act 737 and, an educational handout explaining the diagnosis and prescribed medications. Patient verbalized understanding of the discharge instructions and importance of follow-up visits. Patient was escorted out of the facility by [facility] and placed into a private vehicle to be transported to home.     Patient discharged on the following medications:     Medication List        START taking these medications      nicotine 21 mg/24 hr  Commonly known as: NICODERM CQ  Place 1 patch onto the skin once daily.  Start taking on: 2025            CONTINUE taking these medications      desvenlafaxine succinate 50 MG Tb24  Commonly known as: PRISTIQ  Take 1 tablet (50 mg total) by mouth once daily.               Where to Get Your Medications        You can get these medications from any pharmacy    Bring a paper prescription for each of these medications  desvenlafaxine succinate 50 MG Tb24  nicotine 21 mg/24 hr

## 2025-04-10 NOTE — PLAN OF CARE
Problem: Adult Behavioral Health Plan of Care  Goal: Plan of Care Review  Outcome: Progressing  Flowsheets (Taken 4/10/2025 1118)  Patient Agreement with Plan of Care: agrees  Plan of Care Reviewed With: patient  Goal: Patient-Specific Goal (Individualization)  Outcome: Progressing  Flowsheets (Taken 4/10/2025 1118)  Patient Personal Strengths: independent living skills  Patient Vulnerabilities: substance abuse/addiction  Goal: Adheres to Safety Considerations for Self and Others  Outcome: Progressing  Flowsheets (Taken 4/10/2025 1118)  Adheres to Safety Considerations for Self and Others: making progress toward outcome  Intervention: Develop and Maintain Individualized Safety Plan  Flowsheets (Taken 4/10/2025 1118)  Safety Measures: safety rounds completed  Goal: Optimized Coping Skills in Response to Life Stressors  Outcome: Progressing  Flowsheets (Taken 4/10/2025 1118)  Optimized Coping Skills in Response to Life Stressors: making progress toward outcome  Intervention: Promote Effective Coping Strategies  Flowsheets (Taken 4/10/2025 1118)  Supportive Measures: self-care encouraged  Goal: Develops/Participates in Therapeutic [place] to Support Successful Transition  Outcome: Progressing  Flowsheets (Taken 4/10/2025 1118)  Develops/Participates in Therapeutic [place] to Support Successful Transition: making progress toward outcome  Intervention: Foster Therapeutic [place]  Flowsheets (Taken 4/10/2025 1118)  Trust Relationship/Rapport: care explained  Goal: Rounds/Family Conference  Outcome: Progressing  Flowsheets (Taken 4/10/2025 1118)  Participants:   milieu/psych techs   nursing     Problem: Depressive Signs/Symptoms  Goal: Optimized Energy Level (Depressive Signs/Symptoms)  Outcome: Progressing  Flowsheets (Taken 4/10/2025 1118)  Mutually Determined Action Steps (Optimized Energy Level): grooms self without prompting  Intervention: Optimize Energy Level  Flowsheets (Taken 4/10/2025 1118)  Activity  (Behavioral Health): up ad harry  Patient Performed Hygiene: teeth brushed  Diversional Activity: television  Goal: Optimized Cognitive Function (Depressive Signs/Symptoms)  Outcome: Progressing  Flowsheets (Taken 4/10/2025 1118)  Mutually Determined Action Steps (Optimized Cognitive Function): participates in attention training  Goal: Increased Participation and Engagement (Depressive Signs/Symptoms)  Outcome: Progressing  Flowsheets (Taken 4/10/2025 1118)  Mutually Determined Action Steps (Increased Participation and Engagement): participates in one or more activity  Intervention: Facilitate Participation and Engagement  Flowsheets (Taken 4/10/2025 1118)  Supportive Measures: self-care encouraged  Diversional Activity: television  Goal: Enhanced Self-Esteem and Confidence (Depressive Signs/Symptoms)  Outcome: Progressing  Flowsheets (Taken 4/10/2025 1118)  Mutually Determined Action Steps (Enhanced Self-Esteem and Confidence): verbalizes successes  Intervention: Promote Confidence and Self-Esteem  Flowsheets (Taken 4/10/2025 1118)  Supportive Measures: self-care encouraged  Goal: Improved Mood Symptoms (Depressive Signs/Symptoms)  Outcome: Progressing  Flowsheets (Taken 4/10/2025 1118)  Mutually Determined Action Steps (Improved Mood Symptoms): acknowledges progress  Intervention: Promote Mood Improvement  Flowsheets (Taken 4/10/2025 1118)  Supportive Measures: self-care encouraged  Diversional Activity: television  Goal: Optimized Nutrition Intake (Depressive Signs/Symptoms)  Outcome: Progressing  Flowsheets (Taken 4/10/2025 1118)  Mutually Determined Action Steps (Optimized Nutrition Intake): eats at meal time  Intervention: Promote Optimal Nutrition Intake  Flowsheets (Taken 4/10/2025 1118)  Nutrition Interventions: food preferences provided  Bowel Elimination Promotion: adequate fluid intake promoted  Goal: Improved Psychomotor Symptoms (Depressive Signs/Symptoms)  Outcome: Progressing  Flowsheets (Taken  4/10/2025 1118)  Mutually Determined Action Steps (Improved Psychomotor Symptoms): exhibits decrease in agitation  Intervention: Manage Psychomotor Movement  Flowsheets (Taken 4/10/2025 1118)  Activity (Behavioral Health): up ad harry  Patient Performed Hygiene: teeth brushed  Diversional Activity: television  Goal: Improved Sleep (Depressive Signs/Symptoms)  Outcome: Progressing  Flowsheets (Taken 4/10/2025 1118)  Mutually Determined Action Steps (Improved Sleep): sleeps 4-6 hours at night  Intervention: Promote Healthy Sleep Hygiene  Flowsheets (Taken 4/10/2025 1118)  Sleep Hygiene Promotion: awakenings minimized  Goal: Enhanced Social, Occupational or Functional Skills (Depressive Signs/Symptoms)  Outcome: Progressing  Flowsheets (Taken 4/10/2025 1118)  Mutually Determined Action Steps (Enhanced Social, Occupational or Functional Skills): participates in social skills training  Intervention: Promote Social, Occupational and Functional Ability  Flowsheets (Taken 4/10/2025 1118)  Social Functional Ability Promotion: autonomy promoted     Problem: Suicide Risk  Goal: Absence of Self-Harm  Outcome: Progressing  Intervention: Assess Risk to Self and Maintain Safety  Flowsheets (Taken 4/10/2025 1118)  Behavior Management: behavioral plan reviewed  Self-Harm Prevention: environmental self-harm risks assessed  Intervention: Promote Psychosocial Wellbeing  Flowsheets (Taken 4/10/2025 1118)  Sleep/Rest Enhancement: regular sleep/rest pattern promoted  Supportive Measures: self-care encouraged  Family/Support System Care: self-care encouraged  Intervention: Establish Safety Plan and Continuity of Care  Flowsheets (Taken 4/10/2025 1118)  Safe Transition Promotion: access to lethal means addressed     Problem: Excessive Substance Use  Goal: Optimized Energy Level (Excessive Substance Use)  Outcome: Progressing  Flowsheets (Taken 4/10/2025 1118)  Mutually Determined Action Steps (Optimized Energy Level): grooms self without  prompting  Intervention: Optimize Energy Level  Flowsheets (Taken 4/10/2025 1118)  Activity (Behavioral Health): up ad harry  Patient Performed Hygiene: teeth brushed  Diversional Activity: television  Goal: Improved Behavioral Control (Excessive Substance Use)  Outcome: Progressing  Flowsheets (Taken 4/10/2025 1118)  Mutually Determined Action Steps (Improved Behavioral Control): identifies risky behavior  Intervention: Promote Behavior and Impulse Control  Flowsheets (Taken 4/10/2025 1118)  Behavior Management: behavioral plan reviewed  Goal: Increased Participation and Engagement (Excessive Substance Use)  Outcome: Progressing  Flowsheets (Taken 4/10/2025 1118)  Mutually Determined Action Steps (Increased Participation and Engagement): discusses ongoing recovery plan  Intervention: Facilitate Participation and Engagement  Flowsheets (Taken 4/10/2025 1118)  Supportive Measures: self-care encouraged  Diversional Activity: television  Goal: Improved Physiologic Symptoms (Excessive Substance Use)  Outcome: Progressing  Flowsheets (Taken 4/10/2025 1118)  Mutually Determined Action Steps (Improved Physiologic Symptoms): discusses use pattern  Intervention: Optimize Physiologic Function  Flowsheets (Taken 4/10/2025 1118)  Oral Nutrition Promotion: rest periods promoted  Nutrition Interventions: food preferences provided  Goal: Enhanced Social, Occupational or Functional Skills (Excessive Substance Use)  Outcome: Progressing  Flowsheets (Taken 4/10/2025 1118)  Mutually Determined Action Steps (Enhanced Social, Occupational or Functional Skills): participates in social skills training  Intervention: Promote Social, Occupational and Functional Ability  Flowsheets (Taken 4/10/2025 1118)  Trust Relationship/Rapport: care explained  Social Functional Ability Promotion: autonomy promoted    She is AAO X 4. At present time, she denies SI, HI, hallucinations, and delusions. She has been refusing the Ativan Taper for detox. States  she doesn't need this medication. Good eye contact noted. Speech normal tone and speed. Interacts with selected patients and staff. Continue plan of care and provide a safe and therapeutic environment. Continue to monitor every fifteen minutes for safety.

## 2025-04-10 NOTE — NURSING
At 0847, complaints of moderate anxiety.  Refused Ativan 0.5 mg.,  Atarax 50 mg., administered.  At 0917, verbalizes no anxiety.

## 2025-04-10 NOTE — PLAN OF CARE
Jaylin met reported treatment goal of Better Self-esteem.  CTRS Discharge Recommendations:  Encouraged Pt. to actively utilize available community resources to increase leisure involvement to decrease signs and symptoms of illness.  Encouraged Pt. to utilize coping skills on a regular basis to reduce the risk of decomposition and re-hospitalization.
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
## 2025-04-10 NOTE — PLAN OF CARE
Problem: Adult Behavioral Health Plan of Care  Goal: Plan of Care Review  Outcome: Progressing  Flowsheets (Taken 4/9/2025 2151)  Patient Agreement with Plan of Care: agrees  Plan of Care Reviewed With: patient  Goal: Patient-Specific Goal (Individualization)  Outcome: Progressing  Flowsheets (Taken 4/9/2025 2151)  Patient Personal Strengths: independent living skills  Patient Vulnerabilities: substance abuse/addiction  Goal: Adheres to Safety Considerations for Self and Others  Outcome: Progressing  Flowsheets (Taken 4/9/2025 2151)  Adheres to Safety Considerations for Self and Others: making progress toward outcome  Intervention: Develop and Maintain Individualized Safety Plan  Flowsheets (Taken 4/9/2025 2151)  Safety Measures:   safety rounds completed   safety plan reviewed  Goal: Optimized Coping Skills in Response to Life Stressors  Outcome: Progressing  Flowsheets (Taken 4/9/2025 2151)  Optimized Coping Skills in Response to Life Stressors: making progress toward outcome  Intervention: Promote Effective Coping Strategies  Flowsheets (Taken 4/9/2025 2151)  Supportive Measures:   active listening utilized   verbalization of feelings encouraged   self-care encouraged  Goal: Develops/Participates in Therapeutic [place] to Support Successful Transition  Outcome: Progressing  Flowsheets (Taken 4/9/2025 2151)  Develops/Participates in Therapeutic [place] to Support Successful Transition: making progress toward outcome  Intervention: Foster Therapeutic [place]  Flowsheets (Taken 4/9/2025 2151)  Trust Relationship/Rapport:   care explained   questions encouraged   reassurance provided  Goal: Rounds/Family Conference  Outcome: Progressing  Flowsheets (Taken 4/9/2025 2151)  Participants:   milieu/psych techs   nursing     Problem: Depressive Signs/Symptoms  Goal: Optimized Energy Level (Depressive Signs/Symptoms)  Outcome: Progressing  Flowsheets (Taken 4/9/2025 2151)  Mutually Determined Action Steps (Optimized  Energy Level): grooms self without prompting  Intervention: Optimize Energy Level  Flowsheets (Taken 4/9/2025 2151)  Activity (Behavioral Health): up ad harry  Patient Performed Hygiene: teeth brushed  Diversional Activity: television  Goal: Optimized Cognitive Function (Depressive Signs/Symptoms)  Outcome: Progressing  Flowsheets (Taken 4/9/2025 2151)  Mutually Determined Action Steps (Optimized Cognitive Function): participates in attention training  Goal: Increased Participation and Engagement (Depressive Signs/Symptoms)  Outcome: Progressing  Flowsheets (Taken 4/9/2025 2151)  Mutually Determined Action Steps (Increased Participation and Engagement): participates in one or more activity  Intervention: Facilitate Participation and Engagement  Flowsheets (Taken 4/9/2025 2151)  Supportive Measures:   active listening utilized   verbalization of feelings encouraged   self-care encouraged  Diversional Activity: television  Goal: Enhanced Self-Esteem and Confidence (Depressive Signs/Symptoms)  Outcome: Progressing  Flowsheets (Taken 4/9/2025 2151)  Mutually Determined Action Steps (Enhanced Self-Esteem and Confidence): verbalizes successes  Intervention: Promote Confidence and Self-Esteem  Flowsheets (Taken 4/9/2025 2151)  Supportive Measures:   active listening utilized   verbalization of feelings encouraged   self-care encouraged  Goal: Improved Mood Symptoms (Depressive Signs/Symptoms)  Outcome: Progressing  Flowsheets (Taken 4/9/2025 2151)  Mutually Determined Action Steps (Improved Mood Symptoms): acknowledges progress  Intervention: Promote Mood Improvement  Flowsheets (Taken 4/9/2025 2151)  Supportive Measures:   active listening utilized   verbalization of feelings encouraged   self-care encouraged  Diversional Activity: television  Goal: Optimized Nutrition Intake (Depressive Signs/Symptoms)  Outcome: Progressing  Flowsheets (Taken 4/9/2025 2151)  Mutually Determined Action Steps (Optimized Nutrition Intake):  eats at meal time  Intervention: Promote Optimal Nutrition Intake  Flowsheets (Taken 4/9/2025 2151)  Bowel Elimination Promotion:   adequate fluid intake promoted   ambulation promoted  Goal: Improved Psychomotor Symptoms (Depressive Signs/Symptoms)  Outcome: Progressing  Flowsheets (Taken 4/9/2025 2151)  Mutually Determined Action Steps (Improved Psychomotor Symptoms): exhibits decrease in agitation  Intervention: Manage Psychomotor Movement  Flowsheets (Taken 4/9/2025 2151)  Activity (Behavioral Health): up ad harry  Patient Performed Hygiene: teeth brushed  Diversional Activity: television  Goal: Improved Sleep (Depressive Signs/Symptoms)  Outcome: Progressing  Flowsheets (Taken 4/9/2025 2151)  Mutually Determined Action Steps (Improved Sleep): sleeps 4-6 hours at night  Intervention: Promote Healthy Sleep Hygiene  Flowsheets (Taken 4/9/2025 2151)  Sleep Hygiene Promotion:   awakenings minimized   regular sleep pattern promoted  Goal: Enhanced Social, Occupational or Functional Skills (Depressive Signs/Symptoms)  Outcome: Progressing  Flowsheets (Taken 4/9/2025 2151)  Mutually Determined Action Steps (Enhanced Social, Occupational or Functional Skills): participates in social skills training  Intervention: Promote Social, Occupational and Functional Ability  Flowsheets (Taken 4/9/2025 2151)  Social Functional Ability Promotion:   autonomy promoted   self-expression encouraged   social interaction promoted     Problem: Suicide Risk  Goal: Absence of Self-Harm  Outcome: Progressing  Intervention: Assess Risk to Self and Maintain Safety  Flowsheets (Taken 4/9/2025 2151)  Behavior Management: behavioral plan reviewed  Enhanced Safety Measures: monitored by video  Self-Harm Prevention: environmental self-harm risks assessed  Intervention: Promote Psychosocial Wellbeing  Flowsheets (Taken 4/9/2025 2151)  Sleep/Rest Enhancement: awakenings minimized  Supportive Measures:   active listening utilized   verbalization of  feelings encouraged   self-care encouraged  Family/Support System Care: self-care encouraged  Intervention: Establish Safety Plan and Continuity of Care  Flowsheets (Taken 4/9/2025 2151)  Safe Transition Promotion: access to lethal means addressed     Problem: Excessive Substance Use  Goal: Optimized Energy Level (Excessive Substance Use)  Outcome: Progressing  Flowsheets (Taken 4/9/2025 2151)  Mutually Determined Action Steps (Optimized Energy Level): grooms self without prompting  Intervention: Optimize Energy Level  Flowsheets (Taken 4/9/2025 2151)  Activity (Behavioral Health): up ad harry  Patient Performed Hygiene: teeth brushed  Diversional Activity: television  Goal: Improved Behavioral Control (Excessive Substance Use)  Outcome: Progressing  Intervention: Promote Behavior and Impulse Control  Flowsheets (Taken 4/9/2025 2151)  Behavior Management: behavioral plan reviewed  Goal: Increased Participation and Engagement (Excessive Substance Use)  Outcome: Progressing  Flowsheets (Taken 4/9/2025 2151)  Mutually Determined Action Steps (Increased Participation and Engagement): discusses ongoing recovery plan  Intervention: Facilitate Participation and Engagement  Flowsheets (Taken 4/9/2025 2151)  Supportive Measures:   active listening utilized   verbalization of feelings encouraged   self-care encouraged  Diversional Activity: television  Goal: Improved Physiologic Symptoms (Excessive Substance Use)  Outcome: Progressing  Flowsheets (Taken 4/9/2025 2151)  Mutually Determined Action Steps (Improved Physiologic Symptoms): discusses use pattern  Intervention: Optimize Physiologic Function  Flowsheets (Taken 4/9/2025 2151)  Oral Nutrition Promotion:   social interaction promoted   physical activity promoted  Goal: Enhanced Social, Occupational or Functional Skills (Excessive Substance Use)  Outcome: Progressing  Flowsheets (Taken 4/9/2025 2151)  Mutually Determined Action Steps (Enhanced Social, Occupational or  Functional Skills): participates in social skills training  Intervention: Promote Social, Occupational and Functional Ability  Flowsheets (Taken 4/9/2025 2151)  Trust Relationship/Rapport:   care explained   questions encouraged   reassurance provided  Social Functional Ability Promotion:   autonomy promoted   self-expression encouraged   social interaction promoted     AAOx4 Pt denies SI/HI/AVH. Pt denies having anxiety and depression, reports good sleep and good appetite. Pt has good eye contact and affect is appropriate. Q15 min safety checks continued.

## 2025-04-10 NOTE — GROUP NOTE
Group Psychotherapy       Group Focus: Stress Management   Group Topic: Stress Management/Crisis Plan. Therapist assisted with understanding of treatment plan, identification of responsibilities for actions, assisted patients in identifying sources of support and developing awareness of crisis symptoms.    Number of patients in attendance: 10  Group Start Time: 1045  Group End Time:  1130  Groups Date: 4/9/2025  Group Topic:  Behavioral Health  Group Department: [facility]  Group Facilitators:  Camille Lucero  _____________________________________________________________________    Patient Name: Jaylin Olivia  MRN: [id number]  Patient Class: IP- Psych   Admission Date\Time: 4/7/2025  1:11 AM  Hospital Length of Stay: 3  Primary Care Provider: No primary care provider on file.     Referred by: Acute Psychiatry Unit Treatment Team     Target symptoms: Depression     Patient's response to treatment: Self-disclosure     Progress toward goals: Progressing adequately     Interval History:      Diagnosis:      Plan: Continue treatment on APU

## 2025-04-27 ENCOUNTER — HOSPITAL ENCOUNTER (EMERGENCY)
Facility: HOSPITAL | Age: 30
Discharge: HOME | End: 2025-04-27
Attending: EMERGENCY MEDICINE
Payer: COMMERCIAL

## 2025-04-27 VITALS
HEART RATE: 88 BPM | RESPIRATION RATE: 16 BRPM | DIASTOLIC BLOOD PRESSURE: 79 MMHG | TEMPERATURE: 98 F | OXYGEN SATURATION: 99 % | HEIGHT: 66 IN | SYSTOLIC BLOOD PRESSURE: 108 MMHG | WEIGHT: 214 LBS | BODY MASS INDEX: 34.39 KG/M2

## 2025-04-27 DIAGNOSIS — T14.8XXA HEMATOMA: Primary | ICD-10-CM

## 2025-04-27 PROCEDURE — 10160 PNXR ASPIR ABSC HMTMA BULLA: CPT

## 2025-04-27 PROCEDURE — 99282 EMERGENCY DEPT VISIT SF MDM: CPT | Mod: 25
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
